# Patient Record
Sex: FEMALE | Race: WHITE | HISPANIC OR LATINO | Employment: UNEMPLOYED | URBAN - METROPOLITAN AREA
[De-identification: names, ages, dates, MRNs, and addresses within clinical notes are randomized per-mention and may not be internally consistent; named-entity substitution may affect disease eponyms.]

---

## 2020-07-20 ENCOUNTER — APPOINTMENT (OUTPATIENT)
Dept: ADMISSIONS | Facility: MEDICAL CENTER | Age: 35
End: 2020-07-20
Attending: OBSTETRICS & GYNECOLOGY
Payer: MEDICAID

## 2020-07-20 DIAGNOSIS — Z01.812 PRE-OPERATIVE LABORATORY EXAMINATION: ICD-10-CM

## 2020-07-20 LAB
ANION GAP SERPL CALC-SCNC: 11 MMOL/L (ref 7–16)
BASOPHILS # BLD AUTO: 0.5 % (ref 0–1.8)
BASOPHILS # BLD: 0.03 K/UL (ref 0–0.12)
BUN SERPL-MCNC: 13 MG/DL (ref 8–22)
CALCIUM SERPL-MCNC: 9.2 MG/DL (ref 8.5–10.5)
CHLORIDE SERPL-SCNC: 106 MMOL/L (ref 96–112)
CO2 SERPL-SCNC: 22 MMOL/L (ref 20–33)
CREAT SERPL-MCNC: 0.68 MG/DL (ref 0.5–1.4)
EOSINOPHIL # BLD AUTO: 0.12 K/UL (ref 0–0.51)
EOSINOPHIL NFR BLD: 1.9 % (ref 0–6.9)
ERYTHROCYTE [DISTWIDTH] IN BLOOD BY AUTOMATED COUNT: 41.1 FL (ref 35.9–50)
GLUCOSE SERPL-MCNC: 110 MG/DL (ref 65–99)
HCG SERPL QL: NEGATIVE
HCT VFR BLD AUTO: 43.3 % (ref 37–47)
HGB BLD-MCNC: 14.6 G/DL (ref 12–16)
IMM GRANULOCYTES # BLD AUTO: 0.02 K/UL (ref 0–0.11)
IMM GRANULOCYTES NFR BLD AUTO: 0.3 % (ref 0–0.9)
LYMPHOCYTES # BLD AUTO: 2.65 K/UL (ref 1–4.8)
LYMPHOCYTES NFR BLD: 42.1 % (ref 22–41)
MCH RBC QN AUTO: 31.5 PG (ref 27–33)
MCHC RBC AUTO-ENTMCNC: 33.7 G/DL (ref 33.6–35)
MCV RBC AUTO: 93.5 FL (ref 81.4–97.8)
MONOCYTES # BLD AUTO: 0.37 K/UL (ref 0–0.85)
MONOCYTES NFR BLD AUTO: 5.9 % (ref 0–13.4)
NEUTROPHILS # BLD AUTO: 3.11 K/UL (ref 2–7.15)
NEUTROPHILS NFR BLD: 49.3 % (ref 44–72)
NRBC # BLD AUTO: 0 K/UL
NRBC BLD-RTO: 0 /100 WBC
PLATELET # BLD AUTO: 215 K/UL (ref 164–446)
PMV BLD AUTO: 10.5 FL (ref 9–12.9)
POTASSIUM SERPL-SCNC: 3.8 MMOL/L (ref 3.6–5.5)
RBC # BLD AUTO: 4.63 M/UL (ref 4.2–5.4)
SODIUM SERPL-SCNC: 139 MMOL/L (ref 135–145)
WBC # BLD AUTO: 6.3 K/UL (ref 4.8–10.8)

## 2020-07-20 PROCEDURE — 84703 CHORIONIC GONADOTROPIN ASSAY: CPT

## 2020-07-20 PROCEDURE — 36415 COLL VENOUS BLD VENIPUNCTURE: CPT

## 2020-07-20 PROCEDURE — 80048 BASIC METABOLIC PNL TOTAL CA: CPT

## 2020-07-20 PROCEDURE — 85025 COMPLETE CBC W/AUTO DIFF WBC: CPT

## 2020-07-20 RX ORDER — ACETAMINOPHEN 325 MG/1
325 TABLET ORAL EVERY 4 HOURS PRN
COMMUNITY
End: 2022-05-20

## 2020-07-21 ENCOUNTER — OFFICE VISIT (OUTPATIENT)
Dept: ADMISSIONS | Facility: MEDICAL CENTER | Age: 35
End: 2020-07-21
Attending: OBSTETRICS & GYNECOLOGY
Payer: MEDICAID

## 2020-07-21 DIAGNOSIS — Z01.812 PRE-OPERATIVE LABORATORY EXAMINATION: ICD-10-CM

## 2020-07-21 LAB — COVID ORDER STATUS COVID19: NORMAL

## 2020-07-21 PROCEDURE — U0003 INFECTIOUS AGENT DETECTION BY NUCLEIC ACID (DNA OR RNA); SEVERE ACUTE RESPIRATORY SYNDROME CORONAVIRUS 2 (SARS-COV-2) (CORONAVIRUS DISEASE [COVID-19]), AMPLIFIED PROBE TECHNIQUE, MAKING USE OF HIGH THROUGHPUT TECHNOLOGIES AS DESCRIBED BY CMS-2020-01-R: HCPCS

## 2020-07-22 LAB
SARS-COV-2 RNA RESP QL NAA+PROBE: NOTDETECTED
SPECIMEN SOURCE: NORMAL

## 2020-07-23 NOTE — OR NURSING
COVID-19 Pre-surgery screenin. Do you have an undiagnosed respiratory illness or symptoms such as coughing or sneezing? No  a. Onset of Sx No  b. Acute vs. chronic respiratory illness No    2. Do you have an unexplained fever greater than 100.4 degrees Fahrenheit or 38 degrees Celsius?     No     3. Have you had direct exposure to a patient who tested positive for Covid-19?    No     4. Have you traveled outside [facility] in the last 14 days? No    5. Have you had any loss of your sense of taste or smell? Have you had N/V or sore throat? No    Patient has been informed of visitor policy and asked to wear a mask upon entering the hospital   Yes

## 2020-07-24 ENCOUNTER — HOSPITAL ENCOUNTER (OUTPATIENT)
Facility: MEDICAL CENTER | Age: 35
End: 2020-07-24
Attending: OBSTETRICS & GYNECOLOGY | Admitting: OBSTETRICS & GYNECOLOGY
Payer: MEDICAID

## 2020-07-24 ENCOUNTER — ANESTHESIA EVENT (OUTPATIENT)
Dept: SURGERY | Facility: MEDICAL CENTER | Age: 35
End: 2020-07-24
Payer: MEDICAID

## 2020-07-24 ENCOUNTER — ANESTHESIA (OUTPATIENT)
Dept: SURGERY | Facility: MEDICAL CENTER | Age: 35
End: 2020-07-24
Payer: MEDICAID

## 2020-07-24 VITALS
HEART RATE: 89 BPM | TEMPERATURE: 97.3 F | OXYGEN SATURATION: 100 % | SYSTOLIC BLOOD PRESSURE: 121 MMHG | RESPIRATION RATE: 16 BRPM | DIASTOLIC BLOOD PRESSURE: 72 MMHG | WEIGHT: 172.18 LBS | HEIGHT: 62 IN | BODY MASS INDEX: 31.68 KG/M2

## 2020-07-24 LAB
HCG UR QL: NEGATIVE
PATHOLOGY CONSULT NOTE: NORMAL

## 2020-07-24 PROCEDURE — 160009 HCHG ANES TIME/MIN: Performed by: OBSTETRICS & GYNECOLOGY

## 2020-07-24 PROCEDURE — 160046 HCHG PACU - 1ST 60 MINS PHASE II: Performed by: OBSTETRICS & GYNECOLOGY

## 2020-07-24 PROCEDURE — 502587 HCHG PACK, D&C: Performed by: OBSTETRICS & GYNECOLOGY

## 2020-07-24 PROCEDURE — 700101 HCHG RX REV CODE 250: Performed by: ANESTHESIOLOGY

## 2020-07-24 PROCEDURE — 700102 HCHG RX REV CODE 250 W/ 637 OVERRIDE(OP): Performed by: OBSTETRICS & GYNECOLOGY

## 2020-07-24 PROCEDURE — 700111 HCHG RX REV CODE 636 W/ 250 OVERRIDE (IP): Performed by: ANESTHESIOLOGY

## 2020-07-24 PROCEDURE — 160036 HCHG PACU - EA ADDL 30 MINS PHASE I: Performed by: OBSTETRICS & GYNECOLOGY

## 2020-07-24 PROCEDURE — C2628 CATHETER, OCCLUSION: HCPCS | Performed by: OBSTETRICS & GYNECOLOGY

## 2020-07-24 PROCEDURE — 501838 HCHG SUTURE GENERAL: Performed by: OBSTETRICS & GYNECOLOGY

## 2020-07-24 PROCEDURE — 160035 HCHG PACU - 1ST 60 MINS PHASE I: Performed by: OBSTETRICS & GYNECOLOGY

## 2020-07-24 PROCEDURE — 160029 HCHG SURGERY MINUTES - 1ST 30 MINS LEVEL 4: Performed by: OBSTETRICS & GYNECOLOGY

## 2020-07-24 PROCEDURE — 700101 HCHG RX REV CODE 250: Performed by: OBSTETRICS & GYNECOLOGY

## 2020-07-24 PROCEDURE — 81025 URINE PREGNANCY TEST: CPT

## 2020-07-24 PROCEDURE — A4314 CATH W/DRAINAGE 2-WAY LATEX: HCPCS | Performed by: OBSTETRICS & GYNECOLOGY

## 2020-07-24 PROCEDURE — 500512 HCHG ENDO PEANUT: Performed by: OBSTETRICS & GYNECOLOGY

## 2020-07-24 PROCEDURE — 502703 HCHG DEVICE, LIGASURE V SEALER: Performed by: OBSTETRICS & GYNECOLOGY

## 2020-07-24 PROCEDURE — 700105 HCHG RX REV CODE 258: Performed by: OBSTETRICS & GYNECOLOGY

## 2020-07-24 PROCEDURE — 160047 HCHG PACU  - EA ADDL 30 MINS PHASE II: Performed by: OBSTETRICS & GYNECOLOGY

## 2020-07-24 PROCEDURE — A9270 NON-COVERED ITEM OR SERVICE: HCPCS | Performed by: ANESTHESIOLOGY

## 2020-07-24 PROCEDURE — 700102 HCHG RX REV CODE 250 W/ 637 OVERRIDE(OP): Performed by: ANESTHESIOLOGY

## 2020-07-24 PROCEDURE — 160041 HCHG SURGERY MINUTES - EA ADDL 1 MIN LEVEL 4: Performed by: OBSTETRICS & GYNECOLOGY

## 2020-07-24 PROCEDURE — 501445 HCHG STAPLER, SKIN DISP: Performed by: OBSTETRICS & GYNECOLOGY

## 2020-07-24 PROCEDURE — 160002 HCHG RECOVERY MINUTES (STAT): Performed by: OBSTETRICS & GYNECOLOGY

## 2020-07-24 PROCEDURE — 160048 HCHG OR STATISTICAL LEVEL 1-5: Performed by: OBSTETRICS & GYNECOLOGY

## 2020-07-24 PROCEDURE — 500002 HCHG ADHESIVE, DERMABOND: Performed by: OBSTETRICS & GYNECOLOGY

## 2020-07-24 PROCEDURE — A9270 NON-COVERED ITEM OR SERVICE: HCPCS | Performed by: OBSTETRICS & GYNECOLOGY

## 2020-07-24 PROCEDURE — 500886 HCHG PACK, LAPAROSCOPY: Performed by: OBSTETRICS & GYNECOLOGY

## 2020-07-24 PROCEDURE — 501568 HCHG TROCAR, BLUNTPORT 12MM: Performed by: OBSTETRICS & GYNECOLOGY

## 2020-07-24 PROCEDURE — 501330 HCHG SET, CYSTO IRRIG TUBING: Performed by: OBSTETRICS & GYNECOLOGY

## 2020-07-24 PROCEDURE — 88307 TISSUE EXAM BY PATHOLOGIST: CPT

## 2020-07-24 PROCEDURE — 160025 RECOVERY II MINUTES (STATS): Performed by: OBSTETRICS & GYNECOLOGY

## 2020-07-24 PROCEDURE — 501581 HCHG TROCAR: Performed by: OBSTETRICS & GYNECOLOGY

## 2020-07-24 PROCEDURE — 700104 HCHG RX REV CODE 254: Performed by: ANESTHESIOLOGY

## 2020-07-24 RX ORDER — BUPIVACAINE HYDROCHLORIDE AND EPINEPHRINE 2.5; 5 MG/ML; UG/ML
INJECTION, SOLUTION EPIDURAL; INFILTRATION; INTRACAUDAL; PERINEURAL
Status: DISCONTINUED | OUTPATIENT
Start: 2020-07-24 | End: 2020-07-24 | Stop reason: HOSPADM

## 2020-07-24 RX ORDER — OXYCODONE HCL 5 MG/5 ML
5 SOLUTION, ORAL ORAL
Status: COMPLETED | OUTPATIENT
Start: 2020-07-24 | End: 2020-07-24

## 2020-07-24 RX ORDER — HYDROMORPHONE HYDROCHLORIDE 1 MG/ML
0.4 INJECTION, SOLUTION INTRAMUSCULAR; INTRAVENOUS; SUBCUTANEOUS
Status: DISCONTINUED | OUTPATIENT
Start: 2020-07-24 | End: 2020-07-24 | Stop reason: HOSPADM

## 2020-07-24 RX ORDER — OXYCODONE HCL 5 MG/5 ML
10 SOLUTION, ORAL ORAL
Status: COMPLETED | OUTPATIENT
Start: 2020-07-24 | End: 2020-07-24

## 2020-07-24 RX ORDER — DEXAMETHASONE SODIUM PHOSPHATE 4 MG/ML
INJECTION, SOLUTION INTRA-ARTICULAR; INTRALESIONAL; INTRAMUSCULAR; INTRAVENOUS; SOFT TISSUE PRN
Status: DISCONTINUED | OUTPATIENT
Start: 2020-07-24 | End: 2020-07-24 | Stop reason: SURG

## 2020-07-24 RX ORDER — SIMETHICONE 80 MG
80 TABLET,CHEWABLE ORAL EVERY 8 HOURS PRN
Status: DISCONTINUED | OUTPATIENT
Start: 2020-07-24 | End: 2020-07-24 | Stop reason: HOSPADM

## 2020-07-24 RX ORDER — HYDROMORPHONE HYDROCHLORIDE 1 MG/ML
0.2 INJECTION, SOLUTION INTRAMUSCULAR; INTRAVENOUS; SUBCUTANEOUS
Status: DISCONTINUED | OUTPATIENT
Start: 2020-07-24 | End: 2020-07-24 | Stop reason: HOSPADM

## 2020-07-24 RX ORDER — KETOROLAC TROMETHAMINE 30 MG/ML
INJECTION, SOLUTION INTRAMUSCULAR; INTRAVENOUS PRN
Status: DISCONTINUED | OUTPATIENT
Start: 2020-07-24 | End: 2020-07-24 | Stop reason: SURG

## 2020-07-24 RX ORDER — CEFAZOLIN SODIUM 1 G/3ML
INJECTION, POWDER, FOR SOLUTION INTRAMUSCULAR; INTRAVENOUS PRN
Status: DISCONTINUED | OUTPATIENT
Start: 2020-07-24 | End: 2020-07-24 | Stop reason: SURG

## 2020-07-24 RX ORDER — ONDANSETRON 2 MG/ML
INJECTION INTRAMUSCULAR; INTRAVENOUS PRN
Status: DISCONTINUED | OUTPATIENT
Start: 2020-07-24 | End: 2020-07-24 | Stop reason: SURG

## 2020-07-24 RX ORDER — PROMETHAZINE HYDROCHLORIDE 25 MG/1
12.5 SUPPOSITORY RECTAL EVERY 4 HOURS PRN
Status: DISCONTINUED | OUTPATIENT
Start: 2020-07-24 | End: 2020-07-24 | Stop reason: HOSPADM

## 2020-07-24 RX ORDER — SODIUM CHLORIDE, SODIUM LACTATE, POTASSIUM CHLORIDE, CALCIUM CHLORIDE 600; 310; 30; 20 MG/100ML; MG/100ML; MG/100ML; MG/100ML
INJECTION, SOLUTION INTRAVENOUS CONTINUOUS
Status: DISCONTINUED | OUTPATIENT
Start: 2020-07-24 | End: 2020-07-24 | Stop reason: HOSPADM

## 2020-07-24 RX ORDER — HYDRALAZINE HYDROCHLORIDE 20 MG/ML
5 INJECTION INTRAMUSCULAR; INTRAVENOUS
Status: DISCONTINUED | OUTPATIENT
Start: 2020-07-24 | End: 2020-07-24 | Stop reason: HOSPADM

## 2020-07-24 RX ORDER — HYDROMORPHONE HYDROCHLORIDE 1 MG/ML
0.1 INJECTION, SOLUTION INTRAMUSCULAR; INTRAVENOUS; SUBCUTANEOUS
Status: DISCONTINUED | OUTPATIENT
Start: 2020-07-24 | End: 2020-07-24 | Stop reason: HOSPADM

## 2020-07-24 RX ORDER — MEPERIDINE HYDROCHLORIDE 25 MG/ML
12.5 INJECTION INTRAMUSCULAR; INTRAVENOUS; SUBCUTANEOUS
Status: DISCONTINUED | OUTPATIENT
Start: 2020-07-24 | End: 2020-07-24 | Stop reason: HOSPADM

## 2020-07-24 RX ORDER — DIPHENHYDRAMINE HYDROCHLORIDE 50 MG/ML
12.5 INJECTION INTRAMUSCULAR; INTRAVENOUS
Status: DISCONTINUED | OUTPATIENT
Start: 2020-07-24 | End: 2020-07-24 | Stop reason: HOSPADM

## 2020-07-24 RX ORDER — LABETALOL HYDROCHLORIDE 5 MG/ML
5 INJECTION, SOLUTION INTRAVENOUS
Status: DISCONTINUED | OUTPATIENT
Start: 2020-07-24 | End: 2020-07-24 | Stop reason: HOSPADM

## 2020-07-24 RX ORDER — LIDOCAINE HYDROCHLORIDE 20 MG/ML
JELLY TOPICAL PRN
Status: DISCONTINUED | OUTPATIENT
Start: 2020-07-24 | End: 2020-07-24 | Stop reason: SURG

## 2020-07-24 RX ORDER — ONDANSETRON 2 MG/ML
4 INJECTION INTRAMUSCULAR; INTRAVENOUS
Status: COMPLETED | OUTPATIENT
Start: 2020-07-24 | End: 2020-07-24

## 2020-07-24 RX ORDER — HYDROMORPHONE HYDROCHLORIDE 2 MG/ML
INJECTION, SOLUTION INTRAMUSCULAR; INTRAVENOUS; SUBCUTANEOUS PRN
Status: DISCONTINUED | OUTPATIENT
Start: 2020-07-24 | End: 2020-07-24 | Stop reason: SURG

## 2020-07-24 RX ORDER — HALOPERIDOL 5 MG/ML
1 INJECTION INTRAMUSCULAR
Status: DISCONTINUED | OUTPATIENT
Start: 2020-07-24 | End: 2020-07-24 | Stop reason: HOSPADM

## 2020-07-24 RX ADMIN — OXYCODONE HYDROCHLORIDE 10 MG: 5 SOLUTION ORAL at 11:26

## 2020-07-24 RX ADMIN — POVIDONE-IODINE 15 ML: 10 SOLUTION TOPICAL at 06:23

## 2020-07-24 RX ADMIN — CEFAZOLIN 2 G: 330 INJECTION, POWDER, FOR SOLUTION INTRAMUSCULAR; INTRAVENOUS at 07:45

## 2020-07-24 RX ADMIN — LIDOCAINE HYDROCHLORIDE 0.5 ML: 10 INJECTION, SOLUTION EPIDURAL; INFILTRATION; INTRACAUDAL at 06:23

## 2020-07-24 RX ADMIN — FENTANYL CITRATE 100 MCG: 50 INJECTION, SOLUTION INTRAMUSCULAR; INTRAVENOUS at 07:42

## 2020-07-24 RX ADMIN — SODIUM CHLORIDE, POTASSIUM CHLORIDE, SODIUM LACTATE AND CALCIUM CHLORIDE: 600; 310; 30; 20 INJECTION, SOLUTION INTRAVENOUS at 06:24

## 2020-07-24 RX ADMIN — DEXAMETHASONE SODIUM PHOSPHATE 8 MG: 4 INJECTION, SOLUTION INTRA-ARTICULAR; INTRALESIONAL; INTRAMUSCULAR; INTRAVENOUS; SOFT TISSUE at 07:48

## 2020-07-24 RX ADMIN — PROPOFOL 150 MG: 10 INJECTION, EMULSION INTRAVENOUS at 07:42

## 2020-07-24 RX ADMIN — FENTANYL CITRATE 50 MCG: 50 INJECTION, SOLUTION INTRAMUSCULAR; INTRAVENOUS at 08:21

## 2020-07-24 RX ADMIN — MIDAZOLAM 2 MG: 1 INJECTION INTRAMUSCULAR; INTRAVENOUS at 07:36

## 2020-07-24 RX ADMIN — ONDANSETRON 4 MG: 2 INJECTION INTRAMUSCULAR; INTRAVENOUS at 10:07

## 2020-07-24 RX ADMIN — FENTANYL CITRATE 50 MCG: 50 INJECTION, SOLUTION INTRAMUSCULAR; INTRAVENOUS at 07:50

## 2020-07-24 RX ADMIN — FENTANYL CITRATE 50 MCG: 50 INJECTION, SOLUTION INTRAMUSCULAR; INTRAVENOUS at 08:38

## 2020-07-24 RX ADMIN — HYDROMORPHONE HYDROCHLORIDE 1 MG: 2 INJECTION, SOLUTION INTRAMUSCULAR; INTRAVENOUS; SUBCUTANEOUS at 10:03

## 2020-07-24 RX ADMIN — ONDANSETRON 4 MG: 2 INJECTION INTRAMUSCULAR; INTRAVENOUS at 11:17

## 2020-07-24 RX ADMIN — LIDOCAINE HYDROCHLORIDE 4 ML: 20 JELLY TOPICAL at 07:42

## 2020-07-24 RX ADMIN — KETOROLAC TROMETHAMINE 30 MG: 30 INJECTION, SOLUTION INTRAMUSCULAR at 10:07

## 2020-07-24 RX ADMIN — ROCURONIUM BROMIDE 10 MG: 10 INJECTION, SOLUTION INTRAVENOUS at 08:23

## 2020-07-24 RX ADMIN — FENTANYL CITRATE 50 MCG: 50 INJECTION INTRAMUSCULAR; INTRAVENOUS at 14:36

## 2020-07-24 RX ADMIN — DIPHENHYDRAMINE HYDROCHLORIDE 12.5 MG: 50 INJECTION INTRAMUSCULAR; INTRAVENOUS at 13:13

## 2020-07-24 RX ADMIN — INDIGO CARMINE 5 ML: 8 INJECTION, SOLUTION INTRAMUSCULAR; INTRAVENOUS at 09:40

## 2020-07-24 RX ADMIN — HYDROMORPHONE HYDROCHLORIDE 1 MG: 2 INJECTION, SOLUTION INTRAMUSCULAR; INTRAVENOUS; SUBCUTANEOUS at 09:17

## 2020-07-24 RX ADMIN — ROCURONIUM BROMIDE 40 MG: 10 INJECTION, SOLUTION INTRAVENOUS at 07:42

## 2020-07-24 RX ADMIN — SODIUM CHLORIDE, POTASSIUM CHLORIDE, SODIUM LACTATE AND CALCIUM CHLORIDE: 600; 310; 30; 20 INJECTION, SOLUTION INTRAVENOUS at 08:57

## 2020-07-24 ASSESSMENT — PAIN SCALES - GENERAL: PAIN_LEVEL: 6

## 2020-07-24 NOTE — DISCHARGE INSTRUCTIONS
ACTIVITY: Rest and take it easy for the first 24 hours.  A responsible adult is recommended to remain with you during that time.  It is normal to feel sleepy.  We encourage you to not do anything that requires balance, judgment or coordination.    MILD FLU-LIKE SYMPTOMS ARE NORMAL. YOU MAY EXPERIENCE GENERALIZED MUSCLE ACHES, THROAT IRRITATION, HEADACHE AND/OR SOME NAUSEA.    FOR 24 HOURS DO NOT:  Drive, operate machinery or run household appliances.  Drink beer or alcoholic beverages.   Make important decisions or sign legal documents.    SPECIAL INSTRUCTIONS: Call for a temp >100.4, heavy vaginal bleeding, foul smelling discharge, or if her incision oozes blood, pus, or fluid.  No driving x 2 weeks or while on narcotics, no lifting >20 pounds x 4 weeks, and pelvic rest x 6 weeks.  Ambulate TID.  Call for signs/symptoms of DVT/PE.  NOTICE: This order will  in 24 hours.      DIET: To avoid nausea, slowly advance diet as tolerated, avoiding spicy or greasy foods for the first day.  Add more substantial food to your diet according to your physician's instructions.  Babies can be fed formula or breast milk as soon as they are hungry.  INCREASE FLUIDS AND FIBER TO AVOID CONSTIPATION.    SURGICAL DRESSING/BATHING: see above    FOLLOW-UP APPOINTMENT:  A follow-up appointment should be arranged with your doctor in 1-2 weeks; call to schedule.    You should CALL YOUR PHYSICIAN if you develop:  Fever greater than 101 degrees F.  Pain not relieved by medication, or persistent nausea or vomiting.  Excessive bleeding (blood soaking through dressing) or unexpected drainage from the wound.  Extreme redness or swelling around the incision site, drainage of pus or foul smelling drainage.  Inability to urinate or empty your bladder within 8 hours.  Problems with breathing or chest pain.    You should call 911 if you develop problems with breathing or chest pain.  If you are unable to contact your doctor or surgical center,  you should go to the nearest emergency room or urgent care center.  Physician's telephone #: [phone]    If any questions arise, call your doctor.  If your doctor is not available, please feel free to call the Surgical Center at [phone].  The Center is open Monday through Friday from 7AM to 7PM.  You can also call the HEALTH HOTLINE open 24 hours/day, 7 days/week and speak to a nurse at [phone], or toll free at [phone].    A registered nurse may call you a few days after your surgery to see how you are doing after your procedure.    MEDICATIONS: Resume taking daily medication.  Take prescribed pain medication with food.  If no medication is prescribed, you may take non-aspirin pain medication if needed.  PAIN MEDICATION CAN BE VERY CONSTIPATING.  Take a stool softener or laxative such as senokot, pericolace, or milk of magnesia if needed.    Prescription given for percocet.  Last pain medication given at 11:26 AM, next dose may be taken around 3:26 PM.    If your physician has prescribed pain medication that includes Acetaminophen (Tylenol), do not take additional Acetaminophen (Tylenol) while taking the prescribed medication.    Depression / Suicide Risk    As you are discharged from this [facility] facility, it is important to learn how to keep safe from harming yourself.    Recognize the warning signs:  · Abrupt changes in personality, positive or negative- including increase in energy   · Giving away possessions  · Change in eating patterns- significant weight changes-  positive or negative  · Change in sleeping patterns- unable to sleep or sleeping all the time   · Unwillingness or inability to communicate  · Depression  · Unusual sadness, discouragement and loneliness  · Talk of wanting to die  · Neglect of personal appearance   · Rebelliousness- reckless behavior  · Withdrawal from people/activities they love  · Confusion- inability to concentrate     If you or a loved one observes  any of these behaviors or has concerns about self-harm, here's what you can do:  · Talk about it- your feelings and reasons for harming yourself  · Remove any means that you might use to hurt yourself (examples: pills, rope, extension cords, firearm)  · Get professional help from the community (Mental Health, Substance Abuse, psychological counseling)  · Do not be alone:Call your Safe Contact- someone whom you trust who will be there for you.  · Call your local CRISIS HOTLINE 331-7434 or 787-520-9916  · Call your local Children's Mobile Crisis Response Team [place] (605) 205-2225 or www.[employer]  · Call the toll free National Suicide Prevention Hotlines   · National Suicide Prevention Lifeline 556-949-HVQY (2527)  · National Hope Line Network 800-SUICIDE (956-0096)

## 2020-07-24 NOTE — OP REPORT
DATE OF SERVICE:  07/24/2020    PREOPERATIVE DIAGNOSES:  1.  Abnormal uterine bleeding.  2.  Dysmenorrhea.  3.  Menorrhagia.  4.  Fibroid uterus.  5.  Benign endometrial biopsy.  6.  Nexplanon removal.  7.  Desires definitive surgical management.    POSTOPERATIVE DIAGNOSES:  As above.    PROCEDURES:  Exam under anesthesia, pelviscopy, laparoscopic-assisted vaginal   hysterectomy, bilateral salpingectomy, placement of Samina, cystoscopy and   Nexplanon removal.    SURGEON:  Heidi Norris MD    ASSISTANT:  Brian Laws MD    ANESTHESIOLOGIST:  Johnathan Roldan MD    ANESTHESIA:  General endotracheal tube anesthesia and local anesthetic.    SPECIMENS:  Uterus, cervix and bilateral fallopian tubes.    ESTIMATED BLOOD LOSS:  100 mL.    FINDINGS:  On examination under anesthesia, her uterus is anteverted, mobile   and 10-week size.  There were no cervical masses, no adnexal masses palpated.    The AnaptysBioare manipulator was used.    Nexplanon was palpated in the proper subdermal location in her left upper arm.    Nexplanon was removed without difficulty, but not sent to pathology.    LAPAROSCOPIC FINDINGS:  Normal bilateral tubes and bilateral ovaries.    Bilateral ureters are visualized.  Normal upper abdomen.  Enlarged bulky   uterus without obvious subserosal fibroids or masses.  Filmy adhesion from the   left bowel epiploica to the left pelvic sidewall.    CYSTOSCOPIC FINDINGS:  Bilateral efflux of blue urine from both ureteral   orifices.  No obvious bladder masses.    COMPLICATIONS:  None apparent.    INDICATIONS FOR THE PROCEDURE:  The patient is a 34-year-old para 3-0-0-3   sexually active woman with the Nexplanon in place for contraception and cycle   control, who has been having worsening pelvic pain, dyspareunia, dysmenorrhea,   abnormal uterine bleeding and menorrhagia.  The patient has fibroid uterus.    She desires definitive surgical management.  The patient desires Nexplanon   removal.    DETAILS OF  THE PROCEDURE:  The patient was taken to the operating room where   she underwent general endotracheal tube anesthesia.  The patient was placed in   the dorsal lithotomy position.  An examination under anesthesia was performed   and the findings noted as above.  The patient was then prepped and draped in   the dorsal lithotomy position.  A Wright catheter was placed in her urinary   bladder.  Medium Graves speculum was inserted into the vagina.  The cervix was   visualized.  The anterior lip of the cervix was grasped with an Allis clamp.    The endocervix was dilated to allow the VCare manipulator to be placed   without difficulty.  The VCare manipulator was placed.    The attention was turned to the laparoscopic portion of the procedure.  A 10   mm infraumbilical skin incision was made with the scalpel after the   instillation of 0.25% Marcaine with epinephrine.  The incision was carried   down to the level of the fascia with hemostats.  The fascia was grasped with   Kocher clamps, elevated and incised with Heard scissors.  The incision was   extended laterally with Heard scissors.  Either side of the fascial incision   was sutured with 0 Vicryl as stay sutures.  The S retractors were placed   through all these incisions.  The peritoneum was identified, grasped with   hemostats, and entered sharply with Metzenbaum scissors.  The incision was   extended with Metzenbaum scissors.  Intraabdominal entry was confirmed.  The   Stefano trocar was inserted under direct visualization.  The CO2 gas was   connected and the opening pressure was 8 mmHg.    The patient was placed in Trendelenburg.  The pelvis and abdomen were explored   and findings noted as above.  The bilateral lateral trocar sites were   identified by transilluminating the anterior abdominal wall.  5 mm skin   incisions were made with the scalpel bilaterally after the instillation of   0.25% Marcaine with epinephrine.  The 5 mm trocars were inserted without    difficulty.    The uterus was elevated using the VCare.  The bilateral ureters were   identified coursing through the medial leaves of the broad ligament.  The left   fallopian tube was grasped with the Prestige clamp and brought to the   midline.  Using the LigaSure, a salpingectomy was performed from the   fimbriated end to 2 cm from the cornual region of the uterus.  The   uteroovarian ligament was cauterized in 3 proximal locations and then incised.    The broad ligament was cauterized and then incised and the round ligament   was cauterized in 3 proximal locations and then incised.  The anterior leaf of   the broad ligament was undermined with the one-half blade of the LigaSure.    The vesicouterine peritoneum was grasped with the Prestige clamp, elevated and   the bladder flap was created.  The bladder was pushed off the lower uterine   segment of the uterus.  The left uterine artery was cauterized in 3 proximal   locations and then incised.  Attention was turned to the right side of the   pelvis.  The right ureter was identified coursing through the medial leaf of   the broad ligament.  The right fallopian tube was grasped with the Prestige   clamp and brought to the midline.  Salpingectomy from the fimbriated end of   the fallopian tube to the 2 cm from the cornual region of the uterus was   performed.  The uteroovarian ligament was cauterized in 3 proximal locations   and then incised.  The broad ligament was cauterized and then incised.  The   round ligament was cauterized in 3 proximal locations and then incised.  The   anterior leaf of the broad ligament was elevated, undermined with one-half   blade of the LigaSure, cauterized, and then incised.  The bladder flap was   created in the usual fashion following along the vesicouterine peritoneum.    The uterine arteries were skeletonized with the LigaSure and the uterine   artery was cauterized in 3 proximal locations and then incised.  The uterine    arteries on the right side were cauterized in 3 proximal locations and then   incised.  The pedicles were found to be hemostatic.  The CO2 gas was   discontinued.  The laparoscope was removed.  The light was placed on standby   and attention was turned to the vaginal hysterectomy portion of the procedure.    The patient was placed in high lithotomy position.  The Dinah manipulator was   removed from the patient's uterus.  The long weighted speculum and the right   angle retractor were placed within the vagina.  The cervix was grasped with   double tooth tenacula and the circumferential injection of 0.25% Marcaine with   epinephrine was placed in the cervicovaginal junction.  A circumferential   incision was made using the electrocautery.  The vaginal mucosa was pushed off   of the cervix.  The posterior peritoneum was grasped with the pickups and   entered sharply with Metzenbaum scissors and entry into the posterior   cul-de-sac was performed.  The long weighted speculum was inserted into the   posterior cul-de-sac.  The anterior cul-de-sac was entered in the usual   fashion.  Using the Metzenbaum scissors, sharp dissection was performed and   the bladder was pushed off of the vagina.  The supravaginal septum was then   entered and the Dinorah retractor was placed within the anterior cul-de-sac.    The bilateral uterosacral ligaments were palpated using curved Esme clamps   bilaterally.  These were clamped, incised with Heard scissors, and suture   ligated with 0 Vicryl.  These were held to be incorporated in the closure at   the end of the procedure.  The remaining attachments along the cardinal   ligament and broad ligament were clamped bilaterally with Esme clamps,   incised and suture ligated with 0 Vicryl.  The specimen was delivered through   the vagina.  The pedicles were examined and found to be hemostatic.  The   posterior vaginal cuff along with the posterior peritoneum and the anterior   vaginal cuff  along with the anterior peritoneum was grasped with Allis clamps.    The bilateral uterosacral ligaments were incorporated into the vaginal   angles.  The remainder of the vaginal cuff was reapproximated with 0 Vicryl in   a running locked fashion.  The vaginal cuff was irrigated with sterile water   and areas of bleeding in the midline was noted.  This was made hemostatic with   a figure-of-X of 0 Vicryl suture.  The vaginal cuff was found to be   hemostatic.  Attention was again turned to the laparoscopic portion of the   procedure.  The CO2 gas was reconnected.  The laparoscope was inserted.  The   vaginal cuff was examined and all pedicles were examined.  Hemostasis was   assured.  Samina was placed along all of the pedicles.  The patient received   indigo carmine dye at this point in the procedure.  The catheter was removed.    The 30-degree cystoscope was inserted under direct visualization.  The   bladder was inspected and bilateral efflux of blue urine was noted from both   ureteral orifices.  The cystoscope was removed.  The catheter was returned to   the patient's urinary bladder.  The trocars were removed under direct   visualization.  The CO2 gas was released from the patient's abdomen.  The   fascia was reapproximated with 0 Vicryl using the stay sutures.  The   subcutaneous tissues were reapproximated using 0 Vicryl in a figure-of-X   fashion and the skin was closed with 4-0 Vicryl in a subcuticular fashion.    The wounds were dressed with benzoin, Steri-Strips, 2x2 and Tegaderm.    The patient's left arm was untucked and placed on the arm board.  It was   cleansed with Betadine x3, 10 mL, it was palpated and brought to the skin   surface.  The skin was injected with 0.25% Marcaine with epinephrine.  A 2 mm   skin incision was made with the #11 blade scalpel.  The Nexplanon was brought   to the skin surface, grasped with a hemostat and delivered through the   incision.  The incision was reapproximated  with 3-0 Vicryl in a figure-of-X   fashion.  Steri-Strips were applied to the incision and a pressure wrap using   an Ace bandage was also applied.    The patient was taken out of dorsal lithotomy position.  She was then   extubated and taken to the PACU in stable condition.  Sponge, lap and needle   counts were correct x2.       ____________________________________     MELONY BEAR MD    HTA / NTS    DD:  07/24/2020 11:06:04  DT:  07/24/2020 12:37:16    D#:  [id number]  Job#:  [id number]

## 2020-07-24 NOTE — H&P
DATE OF PROCEDURE:  07/24/2020    PREOPERATIVE DIAGNOSES:  1.  Abnormal uterine bleeding.  2.  Dysmenorrhea.  3.  Menorrhagia.  4.  Benign endometrial biopsy.  5.  Fibroid uterus.  6.  Desires definitive surgical management.  7.  She has a Nexplanon in place and desires removal.    PLANNED PROCEDURES:  Examination under anesthesia, pelviscopy,   laparoscopic-assisted vaginal hysterectomy, bilateral salpingectomy,   cystoscopy and Nexplanon removal.    HISTORY OF PRESENT ILLNESS:  The patient is a 34-year-old para 3-0-0-3   sexually active woman with a Nexplanon in place for contraception and cycle   control, who has been having worsening pelvic pain, dyspareunia, dysmenorrhea,   abnormal uterine bleeding and menorrhagia.  The patient underwent a pelvic   ultrasound in 02/2020, which demonstrated that her uterus was anteverted and   measures 8.8x4.3x6 cm.  There was a right fundal intramural fibroid measuring   1.3x1.2x1.2 cm.  The posterior uterine corpus intramural fibroid measures   1.3x0.8x1.1 cm.  A left fundal intramural fibroid measures1.5x1.5x1.3 cm.    There is a posterior lower uterine segment intramural to subserosal fibroid   measuring 1.3x1.1x0.8 cm.  Her endometrium measures 6.5 mm.  Right ovary   measures 3.5 x3.6x2.1 cm with normal follicles without cyst or masses.  Left   ovary measures 3.4x2.5x3.1 cm.  The patient underwent an endometrial biopsy on   07/02/2020, which demonstrated predominantly mucin with rare strips of   endocervical epithelium, no definite endometrial tissue identified.  Negative   for dysplasia or malignancy.  On 07/09/2020, she underwent a repeat   endometrial biopsy, which demonstrated predominantly endocervical epithelium   in a background of mucin with benign endometrial tissue, negative for   dysplasia.    The patient underwent a Pap smear on 05/29/2020, which was negative for   intraepithelial lesion or malignancy and negative for high-risk HPV.    GC, chlamydia were  negative on 05/29/2020.  The risks, benefits and   alternatives of an examination under anesthesia, pelviscopy,   laparoscopic-assisted vaginal hysterectomy, bilateral salpingectomy,   cystoscopy and removal of Nexplanon were discussed with the patient and she   agrees to proceed.    The patient underwent a repeat ultrasound on 06/24/2020, which demonstrated   that her uterus measured 7.6x4.9x4.6 cm.  The endometrial stripe measured 0.7   cm in thickness.  Normal appearance of the cervix.  Hypoechoic mass along the   right body of the uterus measuring 1.1x1.1x1.2 cm, likely representing an   intramural fibroid.  There is a hypoechoic mass along the posterior body of   the uterus measuring 1.3x1 and likely represents an intramural fibroid.  No   submucosal fibroids noted.  Bilateral ovaries demonstrate normal flow.  Right   ovary measures 2.7x2.5x2 cm.  Left ovary measures 2.3x2x1.9 cm.  Right ovary   with a dominant follicle measuring 1.3 cm.  No solid adnexal masses.  Dominant   follicle in the left ovary measuring 1.3 cm.  No free fluid in the   cul-de-sac.  The impression was intramural uterine fibroids and follicles in   the bilateral ovaries without adnexal masses.    PAST SURGICAL HISTORY:  None.    PAST MEDICAL HISTORY:  Migraines.  She also has rotator cuff injury.      PAST OBSTETRIC HISTORY:  NSVDs x3.    ALLERGIES:  No known drug allergies.    SOCIAL HISTORY:  She is .  She denies alcohol, tobacco or drugs of   abuse.      REVIEW OF SYSTEMS:  Negative.    FAMILY HISTORY:  Noncontributory.    GYNECOLOGIC HISTORY:  She denies STDs, PID or abnormal Paps.  She uses   Nexplanon for contraception and desires removal on the day of surgery.    PREOPERATIVE PHYSICAL EXAMINATION:  VITAL SIGNS:  Her blood pressure is 112/76, pulse 83, temp 97.2.  GENERAL:  Alert, awake and oriented x3, in no acute distress.  LUNGS:  Clear to auscultation bilaterally.  HEART:  Regular rate and rhythm.  No murmurs, rubs or  gallops.  S1, S2 intact.  EXTREMITIES:  No clubbing, cyanosis or edema.  GENITOURINARY:  Examination is deferred.    PREOPERATIVE LABORATORY STUDIES:  COVID testing is negative on 07/21/2020.    White count 6.3, hemoglobin 16.4, hematocrit 43.3, platelets 215.  Qualitative   hCG is negative.  Sodium 139, potassium 3.8, chloride 106, CO2 of 22, glucose   110, BUN 13, creatinine 0.68, calcium 9.2.    ASSESSMENT AND PLAN:  A 34-year-old para 3-0-0-3 sexually active woman with a   Nexplanon in place for contraception and cycle control, who has had a long   history of pelvic pain, dyspareunia, dysmenorrhea, menorrhagia and abnormal   uterine bleeding, who has multiple uterine fibroids and who desires definitive   surgical management.    We will proceed to the operating room for an examination under anesthesia,   pelviscopy, laparoscopic-assisted vaginal hysterectomy, bilateral   salpingectomy, cystoscopy and removal of Nexplanon.  Risks, benefits and   alternatives were discussed with the patient and she agrees to proceed.       ____________________________________     MELONY BEAR MD    HTA / NTS    DD:  07/23/2020 18:05:07  DT:  07/23/2020 20:50:23    D#:  [id number]  Job#:  [id number]

## 2020-07-24 NOTE — ANESTHESIA POSTPROCEDURE EVALUATION
Patient: Moon Barragan    Procedure Summary     Date:  07/24/20 Room / Location:  [place] [address] / SURGERY [facility]    Anesthesia Start:  0736 Anesthesia Stop:  1023    Procedures:       EXAM UNDER ANESTHESIA, PELVIS (N/A Vagina )      PELVISCOPY (N/A Abdomen)      HYSTERECTOMY, TOTAL, VAGINAL, LAPAROSCOPY-ASSISTED (N/A Uterus)      SALPINGECTOMY (Bilateral Fallopian Tube)      CYSTOSCOPY- AND OTHER MEDICALLY NECESSARY PROCEDURES (N/A Urethra)      INCISION AND DRAINAGE - NEXPLANON REMOVAL (N/A Abdomen) Diagnosis:  (INTRAMURAL LEIOMYOMA OF UTERUS, ABNORMAL UTERINE BLEEDING, PELVIC PAIN)    Surgeon:  Heidi Norris M.D. Responsible Provider:  Johnathan Roldan M.D.    Anesthesia Type:  general ASA Status:  2          Final Anesthesia Type: general  Last vitals  BP   Blood Pressure: 121/72    Temp   36.3 °C (97.3 °F)    Pulse   Pulse: 89   Resp   16    SpO2   100 %      Anesthesia Post Evaluation    Patient location during evaluation: PACU  Patient participation: complete - patient participated  Level of consciousness: awake and alert  Pain score: 6    Airway patency: patent  Anesthetic complications: no  Cardiovascular status: hemodynamically stable  Respiratory status: acceptable  Hydration status: euvolemic    PONV: none           Nurse Pain Score: 8 (NPRS)

## 2020-07-24 NOTE — ANESTHESIA TIME REPORT
Anesthesia Start and Stop Event Times     Date Time Event    7/24/2020 0708 Ready for Procedure     0736 Anesthesia Start     1023 Anesthesia Stop        Responsible Staff  07/24/20    Name Role Begin End    Johnathan Roldan M.D. Anesth 0736 1023        Preop Diagnosis (Free Text):  Pre-op Diagnosis     INTRAMURAL LEIOMYOMA OF UTERUS, ABNORMAL UTERINE BLEEDING        Preop Diagnosis (Codes):    Post op Diagnosis  Leiomyoma of uterus  Abnormal uterine bleeding    Premium Reason  Non-Premium    Comments:

## 2020-07-24 NOTE — OR NURSING
Report to [facility] HERNÁNDEZ  Surgery by Dr Magana. 3 incisions to abdomen . 2x2 tegaderm. peripad  And briefs. Left UA steristrips 2x2 tegader and ace wrap.    rec'd toradol and dilaudid 2 mg IV in surgery.  Oxycodone 10 mg PO in PACU. States abdomen burning and cramping.Pt reports pain tolerable. Reported nausea. zofran given,    VSS HR 90 /64 94% room air. RR 14    Order to take goodwin out PACU nurse will dc prior transfer to phase 2.

## 2020-07-24 NOTE — OR NURSING
Dr Magana at bedside. Pt sedated.  Asked Dr Magana if she wanted  Backfill of bladder prior goodwin removal. Dr Magana said no.

## 2020-07-24 NOTE — OR SURGEON
Immediate Post OP Note    PreOp Diagnosis:     1.  Dysmenorrhea/pelvic pain.  2.  Menorrhagia/AUB.  3.  Fibroid uterus.  4.  Desires Nexplanon removal.  5.  Desires definitive surgical management.    PostOp Diagnosis: As above.    Procedure(s):  EXAM UNDER ANESTHESIA, PELVIS - Wound Class: Clean Contaminated  PELVISCOPY - Wound Class: Clean  HYSTERECTOMY, TOTAL, VAGINAL, LAPAROSCOPY-ASSISTED - Wound Class: Clean Contaminated  SALPINGECTOMY - Wound Class: Clean Contaminated  CYSTOSCOPY- AND OTHER MEDICALLY NECESSARY PROCEDURES - Wound Class: Clean Contaminated  PLACEMENT OF DESIRE   NEXPLANON REMOVAL - Wound Class: Clean    Surgeon(s):  OLIVIER Monroy M.D.    Anesthesiologist/Type of Anesthesia:  Anesthesiologist: Johnathan Roldan M.D./General and local anesthetic    Surgical Staff:  Circulator: Filemon Calderon R.N.  Scrub Person: Janina To    Specimens removed if any:  ID Type Source Tests Collected by Time Destination   A : UTERUS, CERVIX, BILATERAL FALLOPIAN TUBES Tissue Uterus PATHOLOGY SPECIMEN Heidi Norris M.D. 7/24/2020 0910        Estimated Blood Loss: 100 cc    Findings: EUA: uterus is AV mobile and 10 weeks size.  No cervical masses.  No adnexal masses.  V Care manipulator used.      Nexplanon palpated in the proper subdermal location in her left upper arm.  Removed without difficulty - not sent to pathology.    Laparoscopic findings: normal bilateral tubes and bilateral ovaries.  Bilateral ureters visualized.  Normal upper abdomen.  Enlarged/bulky uterus without obvious masses/fibroids.     Filmy adhesion from the left bowel epiploica to the pelvic side wall.       Cystoscopic findings: bilateral efflux of blue urine from both ureteral orifices.  No obvious bladder masses.      Complications: None apparent.        7/24/2020 10:22 AM Heidi Norris M.D.

## 2020-07-24 NOTE — OR NURSING
@1204 Pt arrived to Phase 2. Pt has no complaints of pain. Pt has complaints of nausea, queese ease and emesis bag provided. Incision sites noted to be clean dry and intact. Icepack placed on incision sites for comfort.    @1214 Discharge instructions discussed with patient at bedside. Pt verbalizes understanding.    @1316 Pt having nausea and dizziness, Pt medicated per mar for nausea, Pt started on IV fluids per mar. Pt requested spouse to be with patient. Pts Spouse Oliver notified of patients status. Pts spouse to come back to discharge room. Pts O2 saturation down to 88 on room air, Pt placed on 1L O2 at 94% saturation.    @1415 Pt ambulated to bathroom able to void without issue.    @1443 Pt having 8/10 abdominal pain. Pt medicated per mar.    @1531 Pt states her pain is down to 4/10. Pt vomited x1, states she no longer feels nauseas at this time. Pt ambulated to restroom, able to void without issue. PT states she feels ready for discharge, PIV removed. Pt able to dress self with the assistance of PTs .    @1549 Pt discharged via wheelchair to home with pts  and all belongings.

## 2020-07-24 NOTE — ANESTHESIA PROCEDURE NOTES
Airway    Date/Time: 7/24/2020 7:43 AM  Performed by: Johnathan Roldan M.D.  Authorized by: Johnathan Roldan M.D.     Location:  OR  Urgency:  Elective  Difficult Airway: No    Indications for Airway Management:  Anesthesia      Spontaneous Ventilation: absent    Sedation Level:  Deep  Preoxygenated: Yes    Patient Position:  Sniffing  Mask Difficulty Assessment:  0 - not attempted  Final Airway Type:  Endotracheal airway  Final Endotracheal Airway:  ETT  Cuffed: Yes    Technique Used for Successful ETT Placement:  Direct laryngoscopy    Insertion Site:  Oral  Blade Type:  Moan  Laryngoscope Blade/Videolaryngoscope Blade Size:  3  ETT Size (mm):  7.0  Measured from:  Teeth  ETT to Teeth (cm):  23  Placement Verified by: auscultation and capnometry    Cormack-Lehane Classification:  Grade I - full view of glottis  Number of Attempts at Approach:  1

## 2020-07-25 ENCOUNTER — APPOINTMENT (OUTPATIENT)
Dept: RADIOLOGY | Facility: MEDICAL CENTER | Age: 35
End: 2020-07-25
Attending: EMERGENCY MEDICINE
Payer: MEDICAID

## 2020-07-25 ENCOUNTER — HOSPITAL ENCOUNTER (EMERGENCY)
Facility: MEDICAL CENTER | Age: 35
End: 2020-07-25
Attending: EMERGENCY MEDICINE
Payer: MEDICAID

## 2020-07-25 VITALS
BODY MASS INDEX: 31.68 KG/M2 | OXYGEN SATURATION: 97 % | DIASTOLIC BLOOD PRESSURE: 74 MMHG | HEART RATE: 101 BPM | HEIGHT: 62 IN | WEIGHT: 172.18 LBS | RESPIRATION RATE: 22 BRPM | TEMPERATURE: 97.5 F | SYSTOLIC BLOOD PRESSURE: 120 MMHG

## 2020-07-25 DIAGNOSIS — K66.8 SURGICAL PNEUMOPERITONEUM: ICD-10-CM

## 2020-07-25 DIAGNOSIS — R10.11 RIGHT UPPER QUADRANT ABDOMINAL PAIN: ICD-10-CM

## 2020-07-25 LAB
ALBUMIN SERPL BCP-MCNC: 4.5 G/DL (ref 3.2–4.9)
ALBUMIN/GLOB SERPL: 1.8 G/DL
ALP SERPL-CCNC: 73 U/L (ref 30–99)
ALT SERPL-CCNC: 33 U/L (ref 2–50)
ANION GAP SERPL CALC-SCNC: 13 MMOL/L (ref 7–16)
AST SERPL-CCNC: 17 U/L (ref 12–45)
BASOPHILS # BLD AUTO: 0.1 % (ref 0–1.8)
BASOPHILS # BLD: 0.02 K/UL (ref 0–0.12)
BILIRUB SERPL-MCNC: 1.1 MG/DL (ref 0.1–1.5)
BUN SERPL-MCNC: 7 MG/DL (ref 8–22)
CALCIUM SERPL-MCNC: 9 MG/DL (ref 8.5–10.5)
CHLORIDE SERPL-SCNC: 104 MMOL/L (ref 96–112)
CO2 SERPL-SCNC: 22 MMOL/L (ref 20–33)
CREAT SERPL-MCNC: 0.58 MG/DL (ref 0.5–1.4)
EOSINOPHIL # BLD AUTO: 0.03 K/UL (ref 0–0.51)
EOSINOPHIL NFR BLD: 0.2 % (ref 0–6.9)
ERYTHROCYTE [DISTWIDTH] IN BLOOD BY AUTOMATED COUNT: 41.4 FL (ref 35.9–50)
GLOBULIN SER CALC-MCNC: 2.5 G/DL (ref 1.9–3.5)
GLUCOSE SERPL-MCNC: 101 MG/DL (ref 65–99)
HCT VFR BLD AUTO: 41.7 % (ref 37–47)
HGB BLD-MCNC: 14 G/DL (ref 12–16)
IMM GRANULOCYTES # BLD AUTO: 0.05 K/UL (ref 0–0.11)
IMM GRANULOCYTES NFR BLD AUTO: 0.4 % (ref 0–0.9)
LYMPHOCYTES # BLD AUTO: 3.39 K/UL (ref 1–4.8)
LYMPHOCYTES NFR BLD: 24.9 % (ref 22–41)
MCH RBC QN AUTO: 31.7 PG (ref 27–33)
MCHC RBC AUTO-ENTMCNC: 33.6 G/DL (ref 33.6–35)
MCV RBC AUTO: 94.3 FL (ref 81.4–97.8)
MONOCYTES # BLD AUTO: 0.7 K/UL (ref 0–0.85)
MONOCYTES NFR BLD AUTO: 5.1 % (ref 0–13.4)
NEUTROPHILS # BLD AUTO: 9.44 K/UL (ref 2–7.15)
NEUTROPHILS NFR BLD: 69.3 % (ref 44–72)
NRBC # BLD AUTO: 0 K/UL
NRBC BLD-RTO: 0 /100 WBC
PLATELET # BLD AUTO: 205 K/UL (ref 164–446)
PMV BLD AUTO: 10.6 FL (ref 9–12.9)
POTASSIUM SERPL-SCNC: 3.6 MMOL/L (ref 3.6–5.5)
PROT SERPL-MCNC: 7 G/DL (ref 6–8.2)
RBC # BLD AUTO: 4.42 M/UL (ref 4.2–5.4)
SODIUM SERPL-SCNC: 139 MMOL/L (ref 135–145)
TROPONIN T SERPL-MCNC: <6 NG/L (ref 6–19)
WBC # BLD AUTO: 13.6 K/UL (ref 4.8–10.8)

## 2020-07-25 PROCEDURE — 84484 ASSAY OF TROPONIN QUANT: CPT

## 2020-07-25 PROCEDURE — 85025 COMPLETE CBC W/AUTO DIFF WBC: CPT

## 2020-07-25 PROCEDURE — 80053 COMPREHEN METABOLIC PANEL: CPT

## 2020-07-25 PROCEDURE — 96374 THER/PROPH/DIAG INJ IV PUSH: CPT | Mod: XU

## 2020-07-25 PROCEDURE — 96376 TX/PRO/DX INJ SAME DRUG ADON: CPT | Mod: XU

## 2020-07-25 PROCEDURE — 96375 TX/PRO/DX INJ NEW DRUG ADDON: CPT | Mod: XU

## 2020-07-25 PROCEDURE — 71275 CT ANGIOGRAPHY CHEST: CPT

## 2020-07-25 PROCEDURE — 700117 HCHG RX CONTRAST REV CODE 255: Performed by: EMERGENCY MEDICINE

## 2020-07-25 PROCEDURE — 99284 EMERGENCY DEPT VISIT MOD MDM: CPT

## 2020-07-25 PROCEDURE — 700111 HCHG RX REV CODE 636 W/ 250 OVERRIDE (IP): Performed by: EMERGENCY MEDICINE

## 2020-07-25 RX ORDER — HYDROMORPHONE HYDROCHLORIDE 1 MG/ML
0.5 INJECTION, SOLUTION INTRAMUSCULAR; INTRAVENOUS; SUBCUTANEOUS
Status: DISCONTINUED | OUTPATIENT
Start: 2020-07-25 | End: 2020-07-25 | Stop reason: HOSPADM

## 2020-07-25 RX ORDER — IBUPROFEN 400 MG/1
400 TABLET ORAL EVERY 6 HOURS PRN
Qty: 30 TAB | Refills: 0 | Status: SHIPPED | OUTPATIENT
Start: 2020-07-25 | End: 2022-05-20

## 2020-07-25 RX ORDER — DIAZEPAM 2 MG/1
2 TABLET ORAL EVERY 6 HOURS PRN
Qty: 12 TAB | Refills: 0 | Status: SHIPPED | OUTPATIENT
Start: 2020-07-25 | End: 2020-07-28

## 2020-07-25 RX ORDER — HYDROMORPHONE HYDROCHLORIDE 1 MG/ML
1 INJECTION, SOLUTION INTRAMUSCULAR; INTRAVENOUS; SUBCUTANEOUS ONCE
Status: DISCONTINUED | OUTPATIENT
Start: 2020-07-25 | End: 2020-07-25

## 2020-07-25 RX ORDER — LORAZEPAM 2 MG/ML
1 INJECTION INTRAMUSCULAR ONCE
Status: COMPLETED | OUTPATIENT
Start: 2020-07-25 | End: 2020-07-25

## 2020-07-25 RX ORDER — ONDANSETRON 2 MG/ML
4 INJECTION INTRAMUSCULAR; INTRAVENOUS ONCE
Status: COMPLETED | OUTPATIENT
Start: 2020-07-25 | End: 2020-07-25

## 2020-07-25 RX ORDER — KETOROLAC TROMETHAMINE 30 MG/ML
30 INJECTION, SOLUTION INTRAMUSCULAR; INTRAVENOUS ONCE
Status: COMPLETED | OUTPATIENT
Start: 2020-07-25 | End: 2020-07-25

## 2020-07-25 RX ADMIN — IOHEXOL 45 ML: 350 INJECTION, SOLUTION INTRAVENOUS at 19:51

## 2020-07-25 RX ADMIN — LORAZEPAM 1 MG: 2 INJECTION INTRAMUSCULAR; INTRAVENOUS at 18:20

## 2020-07-25 RX ADMIN — HYDROMORPHONE HYDROCHLORIDE 0.5 MG: 1 INJECTION, SOLUTION INTRAMUSCULAR; INTRAVENOUS; SUBCUTANEOUS at 18:17

## 2020-07-25 RX ADMIN — ONDANSETRON 4 MG: 2 INJECTION INTRAMUSCULAR; INTRAVENOUS at 17:19

## 2020-07-25 RX ADMIN — KETOROLAC TROMETHAMINE 30 MG: 30 INJECTION, SOLUTION INTRAMUSCULAR at 21:33

## 2020-07-25 RX ADMIN — HYDROMORPHONE HYDROCHLORIDE 0.5 MG: 1 INJECTION, SOLUTION INTRAMUSCULAR; INTRAVENOUS; SUBCUTANEOUS at 17:20

## 2020-07-25 ASSESSMENT — PAIN DESCRIPTION - DESCRIPTORS: DESCRIPTORS: SHARP

## 2020-07-25 NOTE — ED TRIAGE NOTES
Chief Complaint   Patient presents with   • Chest Pain     Pt had a vaginal hysterectomy yesterday.  Was fine thru the night and then today awoke with a cramping in her rt chest, rt rib.  Hard for her to catch her breath.   Pt is very uncomfortable.  Pain is constant and stabbing.

## 2020-07-26 NOTE — ED NOTES
Discharge instructions given to pt. Prescriptions handed to pt at bedside. Pt educated, verbalizes understanding. All belongings accounted for. Pt wheeled out of ED with family at side to go home. PIV removed and dressing applied.

## 2020-07-26 NOTE — ED PROVIDER NOTES
ED Provider Note    Scribed for Percy York M.D. by Alfred Sotomayor. 7/25/2020  5:19 PM    Primary care provider: None noted  Means of arrival: EMS  History obtained from: Patient  History limited by: None    CHIEF COMPLAINT  Chief Complaint   Patient presents with   • Chest Pain     Pt had a vaginal hysterectomy yesterday.  Was fine thru the night and then today awoke with a cramping in her rt chest, rt rib.  Hard for her to catch her breath.       HPI  Moon Barragan is a 34 y.o. female who presents to the Emergency Department complaining of chest wall pain onset this morning. Patient underwent vaginal hysterectomy yesterday by Dr. Magana. States the pain is to the right side of her anterior inferior chest wall. The pain is exacerbated when taking deep breaths. States the pain has been progressively worsneing in severity. It is now constant and does not stop. She has been taking the percocet that she was prescribed, 1 tab every 4 hours. She has not tried taking 2 tabs at a time. States the medication only makes her sleep and has not helped with the pain. Denies any leg pain or swelling. Denies history of PE or DVT.    PPE Note: I personally donned full PPE for all patient encounters during this visit, including being clean-shaven with an N95 respirator mask, gloves, and eye protection.     Scribe remained outside the patient's room and did not have any contact with the patient for the duration of patient encounter.      REVIEW OF SYSTEMS  Pertinent positives include chest wall pain. Pertinent negatives include no leg pain or swelling.  All other systems reviewed and negative.    PAST MEDICAL HISTORY   has a past medical history of Anesthesia, Bowel habit changes, Dental disorder, and Heart burn.    SURGICAL HISTORY   has a past surgical history that includes other orthopedic surgery (Right, 02/2017); other (02/2018); pelvic examination w anesth (N/A, 7/24/2020); lap,diagnostic abdomen (N/A,  "7/24/2020); vaginal hysterectomy scope total (N/A, 7/24/2020); salpingectomy (Bilateral, 7/24/2020); cystoscopy (N/A, 7/24/2020); and incision and drainage general (N/A, 7/24/2020).    SOCIAL HISTORY  Social History     Tobacco Use   • Smoking status: Never Smoker   • Smokeless tobacco: Never Used   Substance Use Topics   • Alcohol use: Yes     Comment: rare   • Drug use: Not Currently      Social History     Substance and Sexual Activity   Drug Use Not Currently       FAMILY HISTORY  No pertinent family history reported.     CURRENT MEDICATIONS  Home Medications     Reviewed by Peyton Jackson R.N. (Registered Nurse) on 07/25/20 at 1647  Med List Status: <None>   Medication Last Dose Status   acetaminophen (TYLENOL) 325 MG Tab  Active                ALLERGIES  No Known Allergies    PHYSICAL EXAM  VITAL SIGNS: /98   Pulse (!) 102   Temp 36.4 °C (97.5 °F)   Resp 16   Ht 1.575 m (5' 2\")   Wt 78.1 kg (172 lb 2.9 oz)   LMP 07/20/2020 (Approximate)   SpO2 97%   BMI 31.49 kg/m²   Constitutional: Well developed, Well nourished, moderate to severe distress, Non-toxic appearance.   HENT: Normocephalic, Atraumatic, Bilateral external ears normal, Oropharynx moist, No oral exudates.   Eyes: PERRLA, EOMI, Conjunctiva normal, No discharge.   Neck: No tenderness, Supple, No stridor.   Lymphatic: No lymphadenopathy noted.   Cardiovascular: Normal heart rate, Normal rhythm.   Thorax & Lungs: Decreased breath sounds to bilateral bases, otherwise clear to auscultation bilaterally, No respiratory distress, No wheezing, No crackles.   Abdomen: Soft,  Mild diffuse abdominal tenderness, No masses, No pulsatile masses. Recent abdominal incisions.  Skin: Warm, Dry, No erythema, No rash.   Extremities:, No edema No cyanosis.   Musculoskeletal: No tenderness to palpation or major deformities noted.  Intact distal pulses  Neurologic: Awake, alert. Moves all extremities spontaneously.  Psychiatric: Affect normal, Judgment " normal, Mood normal.     LABS  Results for orders placed or performed during the hospital encounter of 07/25/20   CBC w/ Differential   Result Value Ref Range    WBC 13.6 (H) 4.8 - 10.8 K/uL    RBC 4.42 4.20 - 5.40 M/uL    Hemoglobin 14.0 12.0 - 16.0 g/dL    Hematocrit 41.7 37.0 - 47.0 %    MCV 94.3 81.4 - 97.8 fL    MCH 31.7 27.0 - 33.0 pg    MCHC 33.6 33.6 - 35.0 g/dL    RDW 41.4 35.9 - 50.0 fL    Platelet Count 205 164 - 446 K/uL    MPV 10.6 9.0 - 12.9 fL    Neutrophils-Polys 69.30 44.00 - 72.00 %    Lymphocytes 24.90 22.00 - 41.00 %    Monocytes 5.10 0.00 - 13.40 %    Eosinophils 0.20 0.00 - 6.90 %    Basophils 0.10 0.00 - 1.80 %    Immature Granulocytes 0.40 0.00 - 0.90 %    Nucleated RBC 0.00 /100 WBC    Neutrophils (Absolute) 9.44 (H) 2.00 - 7.15 K/uL    Lymphs (Absolute) 3.39 1.00 - 4.80 K/uL    Monos (Absolute) 0.70 0.00 - 0.85 K/uL    Eos (Absolute) 0.03 0.00 - 0.51 K/uL    Baso (Absolute) 0.02 0.00 - 0.12 K/uL    Immature Granulocytes (abs) 0.05 0.00 - 0.11 K/uL    NRBC (Absolute) 0.00 K/uL   Complete Metabolic Panel (CMP)   Result Value Ref Range    Sodium 139 135 - 145 mmol/L    Potassium 3.6 3.6 - 5.5 mmol/L    Chloride 104 96 - 112 mmol/L    Co2 22 20 - 33 mmol/L    Anion Gap 13.0 7.0 - 16.0    Glucose 101 (H) 65 - 99 mg/dL    Bun 7 (L) 8 - 22 mg/dL    Creatinine 0.58 0.50 - 1.40 mg/dL    Calcium 9.0 8.5 - 10.5 mg/dL    AST(SGOT) 17 12 - 45 U/L    ALT(SGPT) 33 2 - 50 U/L    Alkaline Phosphatase 73 30 - 99 U/L    Total Bilirubin 1.1 0.1 - 1.5 mg/dL    Albumin 4.5 3.2 - 4.9 g/dL    Total Protein 7.0 6.0 - 8.2 g/dL    Globulin 2.5 1.9 - 3.5 g/dL    A-G Ratio 1.8 g/dL   Troponin STAT   Result Value Ref Range    Troponin T <6 6 - 19 ng/L   ESTIMATED GFR   Result Value Ref Range    GFR If African American >60 >60 mL/min/1.73 m 2    GFR If Non African American >60 >60 mL/min/1.73 m 2        RADIOLOGY  CT-CTA CHEST PULMONARY ARTERY W/ RECONS   Final Result         1.  No evidence of pulmonary embolism.   2.   Mild dependent pulmonary opacities, likely atelectasis.   3.  Pneumoperitoneum which is presumably related to recent surgery.                 The radiologist's interpretation of all radiological studies have been reviewed by me.      COURSE & MEDICAL DECISION MAKING  Pertinent Labs & Imaging studies reviewed. (See chart for details)    I reviewed the patient's medical records which showed patient had a vaginal hysterectomy done yesterday by Dr. Magana.    5:19 PM - Patient seen and examined at bedside. Patient will be treated with zofran 4 mg, dilaudid injection 0.5 mg. Ordered CT-CTA chest pulmonary artery w/recons, troponin stat, CBC w/differential, CMP to evaluate her symptoms. The differential diagnoses include but are not limited to: PE, diaphragmatic air, pneumonia, chest wall pain    6:16 PM Ordered ativan injection 1 mg    Decision Making:  Patient with moderate to severe right-sided chest pain, concern is for pulmonary embolism given that it is pleuritic, could possibly be some pneumoperitoneum from the surgery.  I had a CT PE study, had to give the patient multiple doses of pain medicines along with muscle spasm medicines.  The patient's CT shows some pneumoperitoneum does not show pulmonary embolism.  The patient will be discharged home, discussed the case with Dr. Reyna who is on-call for Dr. Magana.  She recommends discharge home, anti-inflammatories, pain medicines, muscle relaxers.  We will give the patient a prescription for Motrin, Valium for a muscle relaxer, have the patient follow-up with Dr. Magana as an outpatient, have the patient return with intractable pain.        I reviewed prescription monitoring program for patient's narcotic use before prescribing a scheduled drug.The patient will not drink alcohol nor drive with prescribed medications.} The patient will return for new or worsening symptoms and is stable at the time of discharge.    The patient is referred to a primary physician for  blood pressure management, diabetic screening, and for all other preventative health concerns.        DISPOSITION:  Patient will be discharged home in stable condition.    FOLLOW UP:  [facility], Emergency Dept  [address]  [place] [id number] [phone]    If symptoms worsen    Heidi Norris M.D.  [address]  [place] [address]  [phone]            OUTPATIENT MEDICATIONS:  New Prescriptions    DIAZEPAM (VALIUM) 2 MG TAB    Take 1 Tab by mouth every 6 hours as needed (Muscle spasm) for up to 3 days.    IBUPROFEN (MOTRIN) 400 MG TAB    Take 1 Tab by mouth every 6 hours as needed.         FINAL IMPRESSION  1. Right upper quadrant abdominal pain    2. Surgical pneumoperitoneum          Alfred VIZCAINO (Scribe), am scribing for, and in the presence of, Percy York M.D..    Electronically signed by: Alfred Sotomayor (Scribe), 7/25/2020    IPercy M.D. personally performed the services described in this documentation, as scribed by Alfred Sotomayor in my presence, and it is both accurate and complete. C    The note accurately reflects work and decisions made by me.  Percy York M.D.  7/25/2020  9:34 PM

## 2021-02-19 ENCOUNTER — HOSPITAL ENCOUNTER (OUTPATIENT)
Dept: HOSPITAL 8 - CARD | Age: 36
Discharge: HOME | End: 2021-02-19
Attending: NURSE PRACTITIONER
Payer: MEDICAID

## 2021-02-19 DIAGNOSIS — M79.601: ICD-10-CM

## 2021-02-19 DIAGNOSIS — G62.9: Primary | ICD-10-CM

## 2021-02-19 DIAGNOSIS — R20.2: ICD-10-CM

## 2021-02-19 PROCEDURE — 95886 MUSC TEST DONE W/N TEST COMP: CPT

## 2021-02-19 PROCEDURE — 95908 NRV CNDJ TST 3-4 STUDIES: CPT

## 2021-08-03 ENCOUNTER — PRE-ADMISSION TESTING (OUTPATIENT)
Dept: ADMISSIONS | Facility: MEDICAL CENTER | Age: 36
End: 2021-08-03
Attending: SPECIALIST
Payer: MEDICAID

## 2021-08-03 RX ORDER — GABAPENTIN 100 MG/1
100 CAPSULE ORAL PRN
COMMUNITY
End: 2022-05-20

## 2021-08-03 RX ORDER — METHENAMINE, SODIUM PHOSPHATE, MONOBASIC, MONOHYDRATE, PHENYL SALICYLATE, METHYLENE BLUE, AND HYOSCYAMINE SULFATE 118; 40.8; 36; 10; .12 MG/1; MG/1; MG/1; MG/1; MG/1
1 CAPSULE ORAL 4 TIMES DAILY
COMMUNITY
End: 2022-05-20

## 2021-08-03 ASSESSMENT — FIBROSIS 4 INDEX: FIB4 SCORE: 0.53

## 2021-08-07 NOTE — H&P
DATE OF ADMISSION:  2021     REASON FOR SURGERY:  As stated above, symptomatic pelvic floor relaxation,   pelvic pressure, pain, dyspareunia, mixed urinary incontinence with stress   urinary incontinence demonstrated on urodynamic studies.     HISTORY OF PRESENT ILLNESS:  This is a 35-year-old woman  4, para 3,   spontaneous  1 who previously had a hysterectomy done by her previous   provider, who had a symptomatic pelvic floor relaxation, pelvic pressure and   pain.  This has worsened and she now has increasing pelvic pressure, pain,   bulge at the vaginal introitus, dyspareunia, which she attributes to her   symptomatic pelvic floor relaxation, mixed urinary incontinence symptoms,   stress urinary incontinence demonstrated on urodynamic studies, failing pelvic   floor exercise therapy, declining pessary therapy, now wishing to proceed   forward with attempt at a native tissue repair in the form of an anterior and   posterior vaginal repair, enterocele repair, perineoplasty, sacrospinous vault   suspension, mid urethral sling procedure and cystoscopy.  Risks, benefits and   alternatives of all individual portions of the surgery were addressed with   the patient in detail over several visits.  She has asked appropriate   questions, signed the appropriate consents and wished to proceed forward with   the surgery as planned.  Of note, she does state that she understands the   limitations of surgery in addressing her pelvic pressure, pain, dyspareunia   and overactive bladder symptoms, all of which may be unimproved or actually   worsen with time requiring additional medical or surgical management and even   despite these limitations of surgery, she is wishing to proceed forward with   the scheduled surgery as planned.  Of note, she does state that she   understands the limitations of surgery as described above and wishes to   proceed forward with a native tissue repair.     PAST MEDICAL HISTORY:   Noncontributory.     PAST SURGICAL HISTORY:  Hysterectomy in .     OBSTETRICAL HISTORY:  Three previous deliveries, largest infant 8 pounds   between  and .  One spontaneous .     GYNECOLOGIC HISTORY:  The patient states that she has been amenorrheic since   her hysterectomy.  She does report pelvic pressure, pain, dyspareunia, which   she attributes to her symptomatic pelvic floor relaxation, mixed urinary   incontinence symptoms with stress urinary incontinence demonstrated on   urodynamic studies, recurrent urinary tract infection.  She denies any   previous sexually transmitted diseases or pelvic infections.     FAMILY HISTORY:  Noncontributory.     REVIEW OF SYSTEMS:  Otherwise, unremarkable.     SOCIAL HISTORY:  She is .  She is a housewife.  She denies use of any   alcohol, tobacco or recreational drug use.     MEDICATIONS:  None.     ALLERGIES:  No known drug allergies.     PHYSICAL EXAMINATION:    VITAL SIGNS:  She is afebrile, hemodynamically stable.  Current vital signs   can be seen in electronic medical record.  HEART:  Regular rate and rhythm.  CHEST:  Clear to auscultation bilaterally.  ABDOMEN:  Soft, nondistended, nontender.  Bowel sounds were present.  PELVIC:  Shows grade II anterior, posterior and apical vaginal relaxation with   tenderness to palpation of the apex of the vaginal vault.  Bimanual exam   reveals no adnexal masses or tenderness to palpation.  EXTREMITIES:  Nontender.     DIAGNOSTIC DATA:  Urodynamic studies did demonstrate and confirm stress   urinary incontinence.  Transvaginal pelvic ultrasound was unremarkable.    Urinalysis within normal limits.     ASSESSMENT AND PLAN:  A 35-year-old status post a previous hysterectomy, now   with symptomatic pelvic floor relaxation, pelvic pressure, pain, stress   urinary incontinence demonstrated on urodynamic studies, electing to proceed   forward with the surgery as described above, understand limitations of    surgery.        ______________________________  MD FABIO Palomo/CHRISTY    DD:  08/06/2021 14:15  DT:  08/06/2021 15:00    Job#:  [id number]

## 2021-08-13 ENCOUNTER — PRE-ADMISSION TESTING (OUTPATIENT)
Dept: ADMISSIONS | Facility: MEDICAL CENTER | Age: 36
End: 2021-08-13
Attending: SPECIALIST
Payer: MEDICAID

## 2021-08-13 DIAGNOSIS — Z01.812 PRE-OPERATIVE LABORATORY EXAMINATION: ICD-10-CM

## 2021-08-13 LAB — COVID ORDER STATUS COVID19: NORMAL

## 2021-08-13 PROCEDURE — U0003 INFECTIOUS AGENT DETECTION BY NUCLEIC ACID (DNA OR RNA); SEVERE ACUTE RESPIRATORY SYNDROME CORONAVIRUS 2 (SARS-COV-2) (CORONAVIRUS DISEASE [COVID-19]), AMPLIFIED PROBE TECHNIQUE, MAKING USE OF HIGH THROUGHPUT TECHNOLOGIES AS DESCRIBED BY CMS-2020-01-R: HCPCS

## 2021-08-13 PROCEDURE — U0005 INFEC AGEN DETEC AMPLI PROBE: HCPCS

## 2021-08-14 LAB
SARS-COV-2 RNA RESP QL NAA+PROBE: NOTDETECTED
SPECIMEN SOURCE: NORMAL

## 2021-08-17 ENCOUNTER — HOSPITAL ENCOUNTER (OUTPATIENT)
Facility: MEDICAL CENTER | Age: 36
End: 2021-08-17
Attending: SPECIALIST | Admitting: SPECIALIST
Payer: MEDICAID

## 2021-08-17 ENCOUNTER — ANESTHESIA EVENT (OUTPATIENT)
Dept: SURGERY | Facility: MEDICAL CENTER | Age: 36
End: 2021-08-17
Payer: MEDICAID

## 2021-08-17 ENCOUNTER — ANESTHESIA (OUTPATIENT)
Dept: SURGERY | Facility: MEDICAL CENTER | Age: 36
End: 2021-08-17
Payer: MEDICAID

## 2021-08-17 VITALS
RESPIRATION RATE: 17 BRPM | HEIGHT: 62 IN | OXYGEN SATURATION: 95 % | SYSTOLIC BLOOD PRESSURE: 95 MMHG | DIASTOLIC BLOOD PRESSURE: 61 MMHG | HEART RATE: 83 BPM | TEMPERATURE: 97.4 F | BODY MASS INDEX: 31.24 KG/M2 | WEIGHT: 169.75 LBS

## 2021-08-17 PROCEDURE — 700101 HCHG RX REV CODE 250: Performed by: SPECIALIST

## 2021-08-17 PROCEDURE — 501330 HCHG SET, CYSTO IRRIG TUBING: Performed by: SPECIALIST

## 2021-08-17 PROCEDURE — 160035 HCHG PACU - 1ST 60 MINS PHASE I: Performed by: SPECIALIST

## 2021-08-17 PROCEDURE — 160002 HCHG RECOVERY MINUTES (STAT): Performed by: SPECIALIST

## 2021-08-17 PROCEDURE — 160047 HCHG PACU  - EA ADDL 30 MINS PHASE II: Performed by: SPECIALIST

## 2021-08-17 PROCEDURE — 700111 HCHG RX REV CODE 636 W/ 250 OVERRIDE (IP): Performed by: SPECIALIST

## 2021-08-17 PROCEDURE — A9270 NON-COVERED ITEM OR SERVICE: HCPCS | Performed by: ANESTHESIOLOGY

## 2021-08-17 PROCEDURE — C1771 REP DEV, URINARY, W/SLING: HCPCS | Performed by: SPECIALIST

## 2021-08-17 PROCEDURE — 160025 RECOVERY II MINUTES (STATS): Performed by: SPECIALIST

## 2021-08-17 PROCEDURE — 160036 HCHG PACU - EA ADDL 30 MINS PHASE I: Performed by: SPECIALIST

## 2021-08-17 PROCEDURE — 501838 HCHG SUTURE GENERAL: Performed by: SPECIALIST

## 2021-08-17 PROCEDURE — 502240 HCHG MISC OR SUPPLY RC 0272: Performed by: SPECIALIST

## 2021-08-17 PROCEDURE — 700111 HCHG RX REV CODE 636 W/ 250 OVERRIDE (IP): Performed by: ANESTHESIOLOGY

## 2021-08-17 PROCEDURE — 160029 HCHG SURGERY MINUTES - 1ST 30 MINS LEVEL 4: Performed by: SPECIALIST

## 2021-08-17 PROCEDURE — 700102 HCHG RX REV CODE 250 W/ 637 OVERRIDE(OP): Performed by: ANESTHESIOLOGY

## 2021-08-17 PROCEDURE — 501516 HCHG SUTURE, CAPIO: Performed by: SPECIALIST

## 2021-08-17 PROCEDURE — 160041 HCHG SURGERY MINUTES - EA ADDL 1 MIN LEVEL 4: Performed by: SPECIALIST

## 2021-08-17 PROCEDURE — 160046 HCHG PACU - 1ST 60 MINS PHASE II: Performed by: SPECIALIST

## 2021-08-17 PROCEDURE — 500892 HCHG PACK, PERI-GYN: Performed by: SPECIALIST

## 2021-08-17 PROCEDURE — 160048 HCHG OR STATISTICAL LEVEL 1-5: Performed by: SPECIALIST

## 2021-08-17 PROCEDURE — 700105 HCHG RX REV CODE 258: Performed by: SPECIALIST

## 2021-08-17 PROCEDURE — 700101 HCHG RX REV CODE 250: Performed by: ANESTHESIOLOGY

## 2021-08-17 PROCEDURE — 160009 HCHG ANES TIME/MIN: Performed by: SPECIALIST

## 2021-08-17 PROCEDURE — 700105 HCHG RX REV CODE 258: Performed by: ANESTHESIOLOGY

## 2021-08-17 DEVICE — SYSTEM SLING SOLYX SIS: Type: IMPLANTABLE DEVICE | Site: BLADDER | Status: FUNCTIONAL

## 2021-08-17 RX ORDER — ONDANSETRON 2 MG/ML
4 INJECTION INTRAMUSCULAR; INTRAVENOUS
Status: DISCONTINUED | OUTPATIENT
Start: 2021-08-17 | End: 2021-08-17 | Stop reason: HOSPADM

## 2021-08-17 RX ORDER — OXYCODONE HCL 5 MG/5 ML
5 SOLUTION, ORAL ORAL
Status: COMPLETED | OUTPATIENT
Start: 2021-08-17 | End: 2021-08-17

## 2021-08-17 RX ORDER — MEPERIDINE HYDROCHLORIDE 25 MG/ML
12.5 INJECTION INTRAMUSCULAR; INTRAVENOUS; SUBCUTANEOUS
Status: DISCONTINUED | OUTPATIENT
Start: 2021-08-17 | End: 2021-08-17 | Stop reason: HOSPADM

## 2021-08-17 RX ORDER — HALOPERIDOL 5 MG/ML
1 INJECTION INTRAMUSCULAR
Status: DISCONTINUED | OUTPATIENT
Start: 2021-08-17 | End: 2021-08-17 | Stop reason: HOSPADM

## 2021-08-17 RX ORDER — OXYCODONE HCL 5 MG/5 ML
10 SOLUTION, ORAL ORAL
Status: COMPLETED | OUTPATIENT
Start: 2021-08-17 | End: 2021-08-17

## 2021-08-17 RX ORDER — HYDRALAZINE HYDROCHLORIDE 20 MG/ML
5 INJECTION INTRAMUSCULAR; INTRAVENOUS
Status: DISCONTINUED | OUTPATIENT
Start: 2021-08-17 | End: 2021-08-17 | Stop reason: HOSPADM

## 2021-08-17 RX ORDER — KETOROLAC TROMETHAMINE 30 MG/ML
INJECTION, SOLUTION INTRAMUSCULAR; INTRAVENOUS PRN
Status: DISCONTINUED | OUTPATIENT
Start: 2021-08-17 | End: 2021-08-17 | Stop reason: SURG

## 2021-08-17 RX ORDER — BUPIVACAINE HYDROCHLORIDE AND EPINEPHRINE 2.5; 5 MG/ML; UG/ML
INJECTION, SOLUTION EPIDURAL; INFILTRATION; INTRACAUDAL; PERINEURAL
Status: DISCONTINUED | OUTPATIENT
Start: 2021-08-17 | End: 2021-08-17 | Stop reason: HOSPADM

## 2021-08-17 RX ORDER — ONDANSETRON 2 MG/ML
INJECTION INTRAMUSCULAR; INTRAVENOUS PRN
Status: DISCONTINUED | OUTPATIENT
Start: 2021-08-17 | End: 2021-08-17 | Stop reason: SURG

## 2021-08-17 RX ORDER — GENTAMICIN SULFATE 40 MG/ML
INJECTION, SOLUTION INTRAMUSCULAR; INTRAVENOUS
Status: DISCONTINUED | OUTPATIENT
Start: 2021-08-17 | End: 2021-08-17 | Stop reason: HOSPADM

## 2021-08-17 RX ORDER — DEXAMETHASONE SODIUM PHOSPHATE 4 MG/ML
INJECTION, SOLUTION INTRA-ARTICULAR; INTRALESIONAL; INTRAMUSCULAR; INTRAVENOUS; SOFT TISSUE PRN
Status: DISCONTINUED | OUTPATIENT
Start: 2021-08-17 | End: 2021-08-17 | Stop reason: SURG

## 2021-08-17 RX ORDER — HYDROMORPHONE HYDROCHLORIDE 1 MG/ML
0.1 INJECTION, SOLUTION INTRAMUSCULAR; INTRAVENOUS; SUBCUTANEOUS
Status: DISCONTINUED | OUTPATIENT
Start: 2021-08-17 | End: 2021-08-17 | Stop reason: HOSPADM

## 2021-08-17 RX ORDER — HYDROMORPHONE HYDROCHLORIDE 1 MG/ML
0.4 INJECTION, SOLUTION INTRAMUSCULAR; INTRAVENOUS; SUBCUTANEOUS
Status: DISCONTINUED | OUTPATIENT
Start: 2021-08-17 | End: 2021-08-17 | Stop reason: HOSPADM

## 2021-08-17 RX ORDER — SODIUM CHLORIDE, SODIUM LACTATE, POTASSIUM CHLORIDE, CALCIUM CHLORIDE 600; 310; 30; 20 MG/100ML; MG/100ML; MG/100ML; MG/100ML
INJECTION, SOLUTION INTRAVENOUS CONTINUOUS
Status: DISCONTINUED | OUTPATIENT
Start: 2021-08-17 | End: 2021-08-17 | Stop reason: HOSPADM

## 2021-08-17 RX ORDER — PROMETHAZINE HYDROCHLORIDE 25 MG/1
12.5 SUPPOSITORY RECTAL EVERY 4 HOURS PRN
Status: DISCONTINUED | OUTPATIENT
Start: 2021-08-17 | End: 2021-08-17 | Stop reason: HOSPADM

## 2021-08-17 RX ORDER — HYDROMORPHONE HYDROCHLORIDE 1 MG/ML
0.2 INJECTION, SOLUTION INTRAMUSCULAR; INTRAVENOUS; SUBCUTANEOUS
Status: DISCONTINUED | OUTPATIENT
Start: 2021-08-17 | End: 2021-08-17 | Stop reason: HOSPADM

## 2021-08-17 RX ORDER — EPINEPHRINE 1 MG/ML(1)
AMPUL (ML) INJECTION
Status: DISCONTINUED
Start: 2021-08-17 | End: 2021-08-17 | Stop reason: HOSPADM

## 2021-08-17 RX ORDER — CEFAZOLIN SODIUM 1 G/3ML
INJECTION, POWDER, FOR SOLUTION INTRAMUSCULAR; INTRAVENOUS PRN
Status: DISCONTINUED | OUTPATIENT
Start: 2021-08-17 | End: 2021-08-17 | Stop reason: SURG

## 2021-08-17 RX ORDER — LIDOCAINE HYDROCHLORIDE 20 MG/ML
INJECTION, SOLUTION EPIDURAL; INFILTRATION; INTRACAUDAL; PERINEURAL PRN
Status: DISCONTINUED | OUTPATIENT
Start: 2021-08-17 | End: 2021-08-17 | Stop reason: SURG

## 2021-08-17 RX ORDER — SCOLOPAMINE TRANSDERMAL SYSTEM 1 MG/1
PATCH, EXTENDED RELEASE TRANSDERMAL
Status: COMPLETED
Start: 2021-08-17 | End: 2021-08-17

## 2021-08-17 RX ORDER — GENTAMICIN SULFATE 40 MG/ML
INJECTION, SOLUTION INTRAMUSCULAR; INTRAVENOUS
Status: DISCONTINUED
Start: 2021-08-17 | End: 2021-08-17 | Stop reason: HOSPADM

## 2021-08-17 RX ORDER — DIPHENHYDRAMINE HYDROCHLORIDE 50 MG/ML
6.25 INJECTION INTRAMUSCULAR; INTRAVENOUS
Status: DISCONTINUED | OUTPATIENT
Start: 2021-08-17 | End: 2021-08-17 | Stop reason: HOSPADM

## 2021-08-17 RX ORDER — ACETAMINOPHEN 500 MG
1000 TABLET ORAL EVERY 4 HOURS PRN
Status: DISCONTINUED | OUTPATIENT
Start: 2021-08-17 | End: 2021-08-17 | Stop reason: HOSPADM

## 2021-08-17 RX ORDER — SIMETHICONE 80 MG
80 TABLET,CHEWABLE ORAL EVERY 8 HOURS PRN
Status: DISCONTINUED | OUTPATIENT
Start: 2021-08-17 | End: 2021-08-17 | Stop reason: HOSPADM

## 2021-08-17 RX ORDER — SCOLOPAMINE TRANSDERMAL SYSTEM 1 MG/1
PATCH, EXTENDED RELEASE TRANSDERMAL PRN
Status: DISCONTINUED | OUTPATIENT
Start: 2021-08-17 | End: 2021-08-17 | Stop reason: SURG

## 2021-08-17 RX ORDER — BUPIVACAINE HYDROCHLORIDE 2.5 MG/ML
INJECTION, SOLUTION EPIDURAL; INFILTRATION; INTRACAUDAL
Status: DISCONTINUED
Start: 2021-08-17 | End: 2021-08-17 | Stop reason: HOSPADM

## 2021-08-17 RX ORDER — SODIUM CHLORIDE, SODIUM LACTATE, POTASSIUM CHLORIDE, CALCIUM CHLORIDE 600; 310; 30; 20 MG/100ML; MG/100ML; MG/100ML; MG/100ML
INJECTION, SOLUTION INTRAVENOUS CONTINUOUS
Status: ACTIVE | OUTPATIENT
Start: 2021-08-17 | End: 2021-08-17

## 2021-08-17 RX ORDER — MIDAZOLAM HYDROCHLORIDE 1 MG/ML
INJECTION INTRAMUSCULAR; INTRAVENOUS PRN
Status: DISCONTINUED | OUTPATIENT
Start: 2021-08-17 | End: 2021-08-17 | Stop reason: SURG

## 2021-08-17 RX ADMIN — FENTANYL CITRATE 50 MCG: 50 INJECTION, SOLUTION INTRAMUSCULAR; INTRAVENOUS at 11:35

## 2021-08-17 RX ADMIN — FENTANYL CITRATE 50 MCG: 50 INJECTION, SOLUTION INTRAMUSCULAR; INTRAVENOUS at 11:30

## 2021-08-17 RX ADMIN — SODIUM CHLORIDE, POTASSIUM CHLORIDE, SODIUM LACTATE AND CALCIUM CHLORIDE: 600; 310; 30; 20 INJECTION, SOLUTION INTRAVENOUS at 10:50

## 2021-08-17 RX ADMIN — ACETAMINOPHEN 1000 MG: 500 TABLET ORAL at 11:32

## 2021-08-17 RX ADMIN — SODIUM CHLORIDE, POTASSIUM CHLORIDE, SODIUM LACTATE AND CALCIUM CHLORIDE: 600; 310; 30; 20 INJECTION, SOLUTION INTRAVENOUS at 08:23

## 2021-08-17 RX ADMIN — CEFAZOLIN 2 G: 330 INJECTION, POWDER, FOR SOLUTION INTRAMUSCULAR; INTRAVENOUS at 09:22

## 2021-08-17 RX ADMIN — PROPOFOL 25 MCG/KG/MIN: 10 INJECTION, EMULSION INTRAVENOUS at 09:29

## 2021-08-17 RX ADMIN — MEPERIDINE HYDROCHLORIDE 12.5 MG: 25 INJECTION INTRAMUSCULAR; INTRAVENOUS; SUBCUTANEOUS at 10:51

## 2021-08-17 RX ADMIN — ONDANSETRON 4 MG: 2 INJECTION INTRAMUSCULAR; INTRAVENOUS at 09:44

## 2021-08-17 RX ADMIN — SCOPALAMINE 1 PATCH: 1 PATCH, EXTENDED RELEASE TRANSDERMAL at 09:25

## 2021-08-17 RX ADMIN — LIDOCAINE HYDROCHLORIDE 60 MG: 20 INJECTION, SOLUTION EPIDURAL; INFILTRATION; INTRACAUDAL at 09:22

## 2021-08-17 RX ADMIN — DEXAMETHASONE SODIUM PHOSPHATE 8 MG: 4 INJECTION, SOLUTION INTRA-ARTICULAR; INTRALESIONAL; INTRAMUSCULAR; INTRAVENOUS; SOFT TISSUE at 09:25

## 2021-08-17 RX ADMIN — PROPOFOL 200 MG: 10 INJECTION, EMULSION INTRAVENOUS at 09:22

## 2021-08-17 RX ADMIN — FENTANYL CITRATE 50 MCG: 50 INJECTION, SOLUTION INTRAMUSCULAR; INTRAVENOUS at 09:22

## 2021-08-17 RX ADMIN — OXYCODONE HYDROCHLORIDE 10 MG: 5 SOLUTION ORAL at 11:32

## 2021-08-17 RX ADMIN — MIDAZOLAM HYDROCHLORIDE 2 MG: 1 INJECTION, SOLUTION INTRAMUSCULAR; INTRAVENOUS at 09:19

## 2021-08-17 RX ADMIN — KETOROLAC TROMETHAMINE 30 MG: 30 INJECTION, SOLUTION INTRAMUSCULAR at 09:44

## 2021-08-17 RX ADMIN — FENTANYL CITRATE 50 MCG: 50 INJECTION, SOLUTION INTRAMUSCULAR; INTRAVENOUS at 09:43

## 2021-08-17 RX ADMIN — FENTANYL CITRATE 50 MCG: 50 INJECTION, SOLUTION INTRAMUSCULAR; INTRAVENOUS at 09:32

## 2021-08-17 ASSESSMENT — PAIN DESCRIPTION - PAIN TYPE
TYPE: SURGICAL PAIN
TYPE: ACUTE PAIN;SURGICAL PAIN
TYPE: SURGICAL PAIN
TYPE: SURGICAL PAIN

## 2021-08-17 NOTE — OR NURSING
0955: Pt arrived from OR to bay #4. ID verified. Report received. Connected to monitor. 6L O2 via mask with oral airway. Wright draining to gravity. Peripad in place with no vaginal bleeding noted.     1020: Oral airway removed, unlabored breathing.    1038: weaned to 2L O2 via mask     1045: warm blankets and eleanor hugger applied for shivering    1051: IV demerol given for shivering    1100: telephone updates to spouse using     1130: IV fentanyl given    1135: repeat IV fentanyl given. PO oxycodone given.    1215: phase 2 criteria met    1300: pt dressed and sitting up in recliner chair. Pain manageable. No nausea.    1308: handoff to EDGAR Lyman. Spouse notified to return to facility.

## 2021-08-17 NOTE — ANESTHESIA TIME REPORT
Anesthesia Start and Stop Event Times     Date Time Event    8/17/2021 0906 Ready for Procedure     0918 Anesthesia Start     0956 Anesthesia Stop        Responsible Staff  08/17/21    Name Role Begin End    Jewel Crawford M.D. Anesth 0918 0956        Preop Diagnosis (Free Text):  Pre-op Diagnosis     STRESS URINARY INCONTINENCE, DYSPAREUINA, PELVIC PAIN, CYSTOCELE        Preop Diagnosis (Codes):    Post op Diagnosis  Urinary, incontinence, stress female      Premium Reason  Non-Premium    Comments:

## 2021-08-17 NOTE — ANESTHESIA PROCEDURE NOTES
Airway    Date/Time: 8/17/2021 9:22 AM  Performed by: Jewel Crawford M.D.  Authorized by: Jewel Crawford M.D.     Location:  OR  Urgency:  Elective  Difficult Airway: No    Indications for Airway Management:  Anesthesia      Spontaneous Ventilation: absent    Sedation Level:  Deep  Preoxygenated: Yes    Mask Difficulty Assessment:  1 - vent by mask  Final Airway Type:  Supraglottic airway  Final Supraglottic Airway:  Standard LMA    SGA Size:  3  Number of Attempts at Approach:  1

## 2021-08-17 NOTE — OR SURGEON
Immediate Post OP Note    PreOp Diagnosis: Pelvic pain, symptomatic pelvic floor relaxation,   pelvic pressure, pain, dyspareunia, mixed urinary incontinence with stress   urinary incontinence demonstrated on urodynamic studies.      PostOp Diagnosis: Same      Procedure(s):  COLPORRHAPHY, COMBINED ANTEROPOSTERIOR - WITH PERINEOPLASTY - Wound Class: Clean Contaminated  REPAIR, ENTEROCELE - Wound Class: Clean Contaminated  BLADDER SLING, FEMALE - MIDURETHRAL SLING - Wound Class: Clean Contaminated  COLPOPEXY - SACROSPINOUS VAULT SUSPENSION - Wound Class: Clean Contaminated  CYSTOSCOPY. - Wound Class: Clean Contaminated    Surgeon(s):  OLIVIER Lamb M.D.    Anesthesiologist/Type of Anesthesia:  Anesthesiologist: Jewel Crawford M.D./General    Surgical Staff:  Circulator: Nelly Becerra R.N.; Maggie Mcclelland R.N.  Scrub Person: Billie Bianchi    Specimens removed if any:  None    Estimated Blood Loss: 100 ccs    Findings: Good support of the anterior and the posterior and the apical vaginal tissue without any undue tension at any suture sites, cystoscopy revealed bilateral ureteral efflux, normal bladder and no undue tension at the mid urethra after sling placement.    Complications: None        8/17/2021 9:51 AM Santos Martinez M.D.

## 2021-08-17 NOTE — DISCHARGE INSTRUCTIONS
ACTIVITY: Rest and take it easy for the first 24 hours.  A responsible adult is recommended to remain with you during that time.  It is normal to feel sleepy.  We encourage you to not do anything that requires balance, judgment or coordination.    MILD FLU-LIKE SYMPTOMS ARE NORMAL. YOU MAY EXPERIENCE GENERALIZED MUSCLE ACHES, THROAT IRRITATION, HEADACHE AND/OR SOME NAUSEA.    FOR 24 HOURS DO NOT:  Drive, operate machinery or run household appliances.  Drink beer or alcoholic beverages.   Make important decisions or sign legal documents.    SPECIAL INSTRUCTIONS: SEE ATTACHED HANDOUT    DIET: No restrictions, may resume normal diet. To avoid nausea, slowly advance diet as tolerated, avoiding spicy or greasy foods for the first day.  Add more substantial food to your diet according to your physician's instructions. INCREASE FLUIDS AND FIBER TO AVOID CONSTIPATION.    SURGICAL DRESSING/BATHING: OK to shower tomorrow after goodwin catheter removed. No submerging in baths or any other water until Dr. Martinez clears you.      FOLLOW-UP APPOINTMENT: GO TO DR. MARTINEZ'S OFFICE TOMORROW 8/18/21 FOR GOODWIN CATHETER REMOVAL BETWEEN 9AM-11AM  · A follow-up appointment should be arranged with your doctor in 2 WEEKS; call to schedule.    You should CALL YOUR PHYSICIAN if you develop:  Fever greater than 101 degrees F.  Pain not relieved by medication, or persistent nausea or vomiting.  Excessive bleeding (blood soaking through dressing) or unexpected drainage from the wound.  Extreme redness or swelling around the incision site, drainage of pus or foul smelling drainage.  Inability to urinate or empty your bladder within 8 hours.  Problems with breathing or chest pain.    You should call 911 if you develop problems with breathing or chest pain.  If you are unable to contact your doctor or surgical center, you should go to the nearest emergency room or urgent care center.      Dr. Martinez's telephone #: [phone]    If any questions  arise, call your doctor.  If your doctor is not available, please feel free to call the Surgical Center at [phone]. The Contact Center is open Monday through Friday 7AM to 5PM and may speak to a nurse at [phone], or toll free at [phone].     A registered nurse may call you a few days after your surgery to see how you are doing after your procedure.    MEDICATIONS: Resume taking daily medication.  Take prescribed pain medication with food.  If no medication is prescribed, you may take non-aspirin pain medication if needed.  PAIN MEDICATION CAN BE VERY CONSTIPATING.  Take a stool softener or laxative such as senokot, pericolace, or milk of magnesia if needed.    Prescription AT HOME ALREADY.  Last pain medication given at _________________________.  · If Dr. Martinez has prescribed pain medication that includes Acetaminophen (Tylenol), do not take additional Acetaminophen (Tylenol) while taking the prescribed medication.    Depression / Suicide Risk    As you are discharged from this [facility] facility, it is important to learn how to keep safe from harming yourself.    Recognize the warning signs:  · Abrupt changes in personality, positive or negative- including increase in energy   · Giving away possessions  · Change in eating patterns- significant weight changes-  positive or negative  · Change in sleeping patterns- unable to sleep or sleeping all the time   · Unwillingness or inability to communicate  · Depression  · Unusual sadness, discouragement and loneliness  · Talk of wanting to die  · Neglect of personal appearance   · Rebelliousness- reckless behavior  · Withdrawal from people/activities they love  · Confusion- inability to concentrate     If you or a loved one observes any of these behaviors or has concerns about self-harm, here's what you can do:  · Talk about it- your feelings and reasons for harming yourself  · Remove any means that you might use to hurt yourself (examples: pills,  rope, extension cords, firearm)  · Get professional help from the community (Mental Health, Substance Abuse, psychological counseling)  · Do not be alone:Call your Safe Contact- someone whom you trust who will be there for you.  · Call your local CRISIS HOTLINE 087-4537 or 070-283-5605  · Call your local Children's Mobile Crisis Response Team [place] (137) 686-2414 or www.Artklikk  · Call the toll free National Suicide Prevention Hotlines   · National Suicide Prevention Lifeline 721-499-CWSQ (7975)  · National Hope Line Network 800-SUICIDE (806-0600)

## 2021-08-17 NOTE — ANESTHESIA PREPROCEDURE EVALUATION
Past Medical History:   Diagnosis Date   • Anesthesia     pt's father has woken up during surgery   • Bowel habit changes     constipation   • Delayed emergence from general anesthesia 07/2020    pt slow to wake up post op nausea chills and fever   • Dental disorder     upper denture/ implanted   • Heart burn          Relevant Problems   No relevant active problems       Physical Exam    Airway   Mallampati: II  TM distance: >3 FB  Neck ROM: full       Cardiovascular - normal exam  Rhythm: regular  Rate: normal  (-) murmur     Dental - normal exam  (+) upper dentures           Pulmonary - normal exam  Breath sounds clear to auscultation     Abdominal    Neurological - normal exam                 Anesthesia Plan    ASA 2       Plan - general       Airway plan will be LMA          Induction: intravenous    Postoperative Plan: Postoperative administration of opioids is intended.    Pertinent diagnostic labs and testing reviewed    Informed Consent:    Anesthetic plan and risks discussed with patient.

## 2021-08-17 NOTE — PROGRESS NOTES
1308- Report received from EDGAR Brannon. Patient is sleepy and sitting up in the recliner. Vital signs are stable. Patient is on room air.    1320-Provided patient with discharge education with  at bedside. All questions answered.    1336- Patient feels ready to be discharged and mets discharge criteria set by Dr. Martinez. PIV removed and patient discharged home via wheelchair.

## 2021-08-17 NOTE — OP REPORT
DATE OF SERVICE:  8/17/2021      PREOPERATIVE DIAGNOSES:  symptomatic pelvic floor relaxation,   pelvic pressure, pain, dyspareunia, mixed urinary incontinence with stress   urinary incontinence demonstrated on urodynamic studies.     POSTOPERATIVE DIAGNOSES: Same     PROCEDURES PERFORMED:  Anterior and posterior vaginal repair, enterocele    repair, sacrospinous vault suspension, Solyx midurethral sling    procedure, cystoscopy.     SURGEON:  Santos Martinez MD     ASSISTANT:  Brian Laws MD     ANESTHESIA:  General     ANESTHESIOLOGIST:  Anesthesiologist: Jewel Crawford M.D.      ESTIMATED BLOOD LOSS FOR THE PROCEDURE:  Less than 15 mL.     FINDINGS:  Good support of the anterior and posterior vaginal walls in    addition to the apical vaginal tissue without any undue tension in the suture    sites.  Cystoscopy revealed bilateral ureteral efflux and normal bladder and    no undue tension noted at the level of the mid urethra after sling placement    on cystoscopic evaluation.     DESCRIPTION OF PROCEDURE:  The patient was taken to the operating room where general anesthesia was performed without difficulty.  Patient was then prepped   and draped in usual sterile fashion.  Lower extremities placed in Jesús stirrups.    Attention was first turned to the perineum where a weighted speculum was placed with the use of Scruggs retractor.  Anterior vaginal mucosa was then injected with 10 mL of 0.25% Marcaine with epinephrine.  The vaginal mucosa was then dissected off the underlying pubocervical fascia bessy until the levator muscles were then reached.  A separate incision just inferior the urethral meatus was made after injecting 5 ccs of 0.25% Marcaine with epinephrine before dissecting the vaginal mucosa off of the pubocervical fascia and placement of the Solyx transducer loaded with the sling and passed through the incision at the mid urethra and placed through the endopelvic fascia and then into the Obturator internal  muscle and released the opposite side was then loaded on the transducer and the opposite was placed into the Obturator internus muscle and before releasing the sling the Wright catheter was removed and a 30 degree cystoscope was placed with bilateral ureteral efflux, normal bladder and no undue tension at the mid urethra the sling was then released from the transducer and the tensioning was finalized under cystoscopic evaluation. The sling was then released, tensioning of position was then finalized under direct cystoscopic evaluation.  Cystoscope removed.  Wright catheter replaced.  All excess vaginal mucosa.  The vaginal mucosa was then reapproximated with 2-0 Vicryl in a running interlocking fashion in one segment.  Posterior vaginal mucosa was then injected with 10 mL of 0.25% Marcaine with epinephrine.  The vaginal mucosa was then dissected off the underlying rectovaginal fascia bessy until the levator muscles were then reached.   Horizontal mattress sutures were then used to reapproximate the rectovaginal fascia in the midline in a double 0 closure, thereby reducing the midline rectocele.  A large enterocele was located at the superior aspect of the    dissection.  Dissection of the sacrospinous process was then performed in the ischial spine, 3 cm medial along the sacrospinous ligament with straight catheterization needle device, 0 Ethibond suture was taken through the sacrospinous ligament taken out through the right apical portion of the vaginal cuff and the overlying vaginal mucosa.  Once tied down, there was good reapproximation of the apex of the vaginal vault to the sacrospinous ligament.  The rectovaginal septum was then reapproximated in the posterior    aspect of the vaginal cuff in a double pursestring suture, thereby reducing the large enterocele located at the superior aspect of the dissection.  All excess vaginal mucosa was then trimmed.  The vaginal mucosa was then reapproximated with 2-0 Vicryl in  running locking fashion in one segment performing a perineoplasty on the perineum.    The patient tolerated procedure well and was awoken from general anesthesia, was taken to the recovery in stable condition.     ____________________________________   HOLLIE HELLER MD

## 2021-08-17 NOTE — ANESTHESIA POSTPROCEDURE EVALUATION
Patient: Moon Barragan    Procedure Summary     Date: 08/17/21 Room / Location: [facility] ROOM 23 / SURGERY SAME DAY [facility]    Anesthesia Start: 0918 Anesthesia Stop: 0956    Procedures:       COLPORRHAPHY, COMBINED ANTEROPOSTERIOR - WITH PERINEOPLASTY (N/A Vagina )      REPAIR, ENTEROCELE (N/A Vagina )      BLADDER SLING, FEMALE - MIDURETHRAL SLING (N/A Vagina )      COLPOPEXY - SACROSPINOUS VAULT SUSPENSION (N/A Vagina )      CYSTOSCOPY. (N/A Bladder) Diagnosis: (STRESS URINARY INCONTINENCE, DYSPAREUINA, PELVIC PAIN, CYSTOCELE)    Surgeons: Santos Martinez M.D. Responsible Provider: Jewel Crawford M.D.    Anesthesia Type: general ASA Status: 2          Final Anesthesia Type: general  Last vitals  BP   Blood Pressure: 113/69    Temp   36.3 °C (97.4 °F)    Pulse   67   Resp   14    SpO2   100 %      Anesthesia Post Evaluation    Patient location during evaluation: PACU  Patient participation: complete - patient participated  Level of consciousness: awake and alert    Airway patency: patent  Anesthetic complications: no  Cardiovascular status: hemodynamically stable  Respiratory status: acceptable  Hydration status: euvolemic    PONV: none          No complications documented.     Nurse Pain Score: 0 (NPRS)

## 2022-05-20 ENCOUNTER — HOSPITAL ENCOUNTER (EMERGENCY)
Facility: MEDICAL CENTER | Age: 37
End: 2022-05-20
Attending: EMERGENCY MEDICINE
Payer: MEDICAID

## 2022-05-20 VITALS
TEMPERATURE: 97.1 F | WEIGHT: 161.6 LBS | DIASTOLIC BLOOD PRESSURE: 82 MMHG | HEART RATE: 75 BPM | RESPIRATION RATE: 19 BRPM | HEIGHT: 62 IN | SYSTOLIC BLOOD PRESSURE: 130 MMHG | BODY MASS INDEX: 29.74 KG/M2 | OXYGEN SATURATION: 98 %

## 2022-05-20 DIAGNOSIS — S16.1XXA STRAIN OF NECK MUSCLE, INITIAL ENCOUNTER: ICD-10-CM

## 2022-05-20 DIAGNOSIS — S09.90XA CLOSED HEAD INJURY, INITIAL ENCOUNTER: ICD-10-CM

## 2022-05-20 PROCEDURE — 700102 HCHG RX REV CODE 250 W/ 637 OVERRIDE(OP): Performed by: EMERGENCY MEDICINE

## 2022-05-20 PROCEDURE — 99283 EMERGENCY DEPT VISIT LOW MDM: CPT

## 2022-05-20 PROCEDURE — A9270 NON-COVERED ITEM OR SERVICE: HCPCS | Performed by: EMERGENCY MEDICINE

## 2022-05-20 PROCEDURE — 700111 HCHG RX REV CODE 636 W/ 250 OVERRIDE (IP): Performed by: EMERGENCY MEDICINE

## 2022-05-20 PROCEDURE — 96372 THER/PROPH/DIAG INJ SC/IM: CPT

## 2022-05-20 RX ORDER — SUMATRIPTAN 6 MG/.5ML
6 INJECTION, SOLUTION SUBCUTANEOUS ONCE
Status: COMPLETED | OUTPATIENT
Start: 2022-05-20 | End: 2022-05-20

## 2022-05-20 RX ORDER — IBUPROFEN 600 MG/1
600 TABLET ORAL ONCE
Status: COMPLETED | OUTPATIENT
Start: 2022-05-20 | End: 2022-05-20

## 2022-05-20 RX ORDER — ACETAMINOPHEN 500 MG
1000 TABLET ORAL ONCE
Status: COMPLETED | OUTPATIENT
Start: 2022-05-20 | End: 2022-05-20

## 2022-05-20 RX ADMIN — IBUPROFEN 600 MG: 600 TABLET, FILM COATED ORAL at 16:45

## 2022-05-20 RX ADMIN — SUMATRIPTAN 6 MG: 6 INJECTION, SOLUTION SUBCUTANEOUS at 16:45

## 2022-05-20 RX ADMIN — ACETAMINOPHEN 1000 MG: 500 TABLET ORAL at 16:45

## 2022-05-20 ASSESSMENT — FIBROSIS 4 INDEX: FIB4 SCORE: 0.55

## 2022-05-20 NOTE — ED NOTES
Patient medicated per orders.     Discharge instructions given to patient she verbalized understanding. Patient ambulated out of department with steady gait.

## 2022-05-20 NOTE — ED TRIAGE NOTES
Patient to ED with complaints of headache, neck pain, nausea, photosensitivity follow a mirror falling on head this am. She states she remembers the event. She has tenderness to right posterior head and neck.     Pt educated on ED process and asked to wait in lobby. Patient educated on importance of alerting staff to new or worsening symptoms or concerns.

## 2022-05-20 NOTE — ED PROVIDER NOTES
ED Provider  Scribed for Cristino Mueller D.O. by Suresh Miranda. 5/20/2022  3:59 PM    Means of arrival:Walk in  History obtained from:Patient  History limited by: None    CHIEF COMPLAINT  Chief Complaint   Patient presents with    T-5000    Head Injury    Neck Pain       HPI  Moon Barragan is a 36 y.o. female who presents for evaluation of a head injury onset this morning. Patient states she was in her daughter's closet when a mirror fell off the wall and hit her in the head. She endorses associated pain to the back of her head, spasms, nausea, light aversion, and headache, but denies any vomiting, loss of consciousness, vision changes, bleeding, numbness, weakness, or seizure. Patient states her pain is exacerbated when lying down. She denies taking any blood thinners or having a history of headaches or migraines.    REVIEW OF SYSTEMS  See HPI for further details. All other systems are negative.   C    PAST MEDICAL HISTORY   has a past medical history of Anesthesia, Bowel habit changes, Delayed emergence from general anesthesia (07/2020), Dental disorder, Heart burn, and PONV (postoperative nausea and vomiting).    SOCIAL HISTORY  Social History     Tobacco Use    Smoking status: Never Smoker    Smokeless tobacco: Never Used   Vaping Use    Vaping Use: Never used   Substance and Sexual Activity    Alcohol use: Yes     Comment: rare    Drug use: Never    Sexual activity: None noted when reviewed       SURGICAL HISTORY   has a past surgical history that includes pelvic examination w anesth (N/A, 7/24/2020); lap,diagnostic abdomen (N/A, 7/24/2020); cystoscopy (N/A, 7/24/2020); incision and drainage general (N/A, 7/24/2020); vaginal hysterectomy scope total (N/A, 7/24/2020); salpingectomy (Bilateral, 7/24/2020); other orthopedic surgery (Right, 02/2017); other (02/2018); combined ant/post colporrhaphy (N/A, 8/17/2021); cystourethroscopy (N/A, 8/17/2021); enterocele repair (N/A, 8/17/2021); bladder sling female  "(N/A, 8/17/2021); and vaginal suspension (N/A, 8/17/2021).    CURRENT MEDICATIONS  Home Medications       Reviewed by Rosmery Gan R.N. (Registered Nurse) on 05/20/22 at 1502  Med List Status: Complete     Medication Last Dose Status        Patient Kt Taking any Medications                           ALLERGIES  No Known Allergies    PHYSICAL EXAM  VITAL SIGNS: /83   Pulse 74   Temp 36.6 °C (97.9 °F) (Temporal)   Resp 16   Ht 1.575 m (5' 2\")   Wt 73.3 kg (161 lb 9.6 oz)   LMP 07/20/2020 (Approximate)   SpO2 100%   BMI 29.56 kg/m²   Constitutional: Alert in no apparent distress.  HENT: Tenderness to right parietal area with no hematoma or laceration. No signs of trauma, mucous membranes are moist  Eyes: Conjunctiva normal, Non-icteric.   Neck: Normal range of motion, No tenderness, Supple.  Lymphatic: No lymphadenopathy noted.   Cardiovascular: Regular rate and rhythm, no murmurs.   Thorax & Lungs: Normal breath sounds, No respiratory distress, No wheezing, No chest tenderness.   Abdomen: Bowel sounds normal, Soft, No tenderness, No masses, No pulsatile masses. No peritoneal signs.  Skin: Warm, Dry, normal color.   Back: No bony tenderness, No CVA tenderness.   Extremities: No edema, No tenderness, No cyanosis  Musculoskeletal: Good range of motion in all major joints. No tenderness to palpation or major deformities noted.   Neurologic: Alert and oriented x4, Normal motor function, Normal sensory function, No focal deficits noted.   Psychiatric: Affect normal, Judgment normal, Mood normal.     DIAGNOSTIC STUDIES / PROCEDURES    COURSE  Pertinent Labs & Imaging studies reviewed. (See chart for details)    3:59 PM - Patient seen and examined at bedside. Discussed plan of care. The patient will be medicated with Tylenol 1000 mg, Motrin 600 mg, and Imitrex 6 mg. Informed the patient the likelihood of her experiencing a brain bleed is minimal given her symptoms. Discussed plans for discharge and " return precautions. Patient verbalizes understanding and agreement to this plan of care.      MEDICAL DECISION MAKING  This is a 36 y.o. female who presents with an injury to the right side of the scalp.  Mirror fell.  The mirror did not break there was no break in the skin.    Did not get knocked out she had no vomiting her neurological exam is normal.  According to the [place] head CT evaluation she does not meet criteria or need for CT scan.  She is discharged home with symptomatic treatment.      The patient will return for new or worsening symptoms and is stable at the time of discharge.    The patient is referred to a primary physician for blood pressure management, diabetic screening, and for all other preventative health concerns.    DISPOSITION:  Patient will be discharged home in stable condition.    FOLLOW UP:  CORRINA BalesRPhyllisNPhyllis  [address]  [address]  [facility] [id number] [phone]    In 3 days      FINAL IMPRESSION  1. Strain of neck muscle, initial encounter    2. Closed head injury, initial encounter         Suresh VIZCAINO (Alysonibe), am scribing for, and in the presence of, Cristino Mueller D.O..    Electronically signed by: Suresh Miranda (Alysonibe), 5/20/2022    ICristino D.O. personally performed the services described in this documentation, as scribed by Suresh Miranda in my presence, and it is both accurate and complete.    The note accurately reflects work and decisions made by me.  Cristino Mueller D.O.  5/20/2022  5:51 PM

## 2025-07-06 ENCOUNTER — OFFICE VISIT (OUTPATIENT)
Dept: URGENT CARE | Facility: PHYSICIAN GROUP | Age: 40
End: 2025-07-06
Payer: COMMERCIAL

## 2025-07-06 VITALS
TEMPERATURE: 98.1 F | HEIGHT: 62 IN | SYSTOLIC BLOOD PRESSURE: 118 MMHG | WEIGHT: 169 LBS | HEART RATE: 109 BPM | BODY MASS INDEX: 31.1 KG/M2 | RESPIRATION RATE: 16 BRPM | DIASTOLIC BLOOD PRESSURE: 58 MMHG | OXYGEN SATURATION: 99 %

## 2025-07-06 DIAGNOSIS — J06.9 VIRAL URI WITH COUGH: ICD-10-CM

## 2025-07-06 LAB
FLUAV RNA SPEC QL NAA+PROBE: NEGATIVE
FLUBV RNA SPEC QL NAA+PROBE: NEGATIVE
RSV RNA SPEC QL NAA+PROBE: NEGATIVE
S PYO DNA SPEC NAA+PROBE: NOT DETECTED
SARS-COV-2 RNA RESP QL NAA+PROBE: NEGATIVE

## 2025-07-06 PROCEDURE — 87651 STREP A DNA AMP PROBE: CPT | Performed by: STUDENT IN AN ORGANIZED HEALTH CARE EDUCATION/TRAINING PROGRAM

## 2025-07-06 PROCEDURE — 3074F SYST BP LT 130 MM HG: CPT | Performed by: STUDENT IN AN ORGANIZED HEALTH CARE EDUCATION/TRAINING PROGRAM

## 2025-07-06 PROCEDURE — 99213 OFFICE O/P EST LOW 20 MIN: CPT | Performed by: STUDENT IN AN ORGANIZED HEALTH CARE EDUCATION/TRAINING PROGRAM

## 2025-07-06 PROCEDURE — 3078F DIAST BP <80 MM HG: CPT | Performed by: STUDENT IN AN ORGANIZED HEALTH CARE EDUCATION/TRAINING PROGRAM

## 2025-07-06 PROCEDURE — 87637 SARSCOV2&INF A&B&RSV AMP PRB: CPT | Performed by: STUDENT IN AN ORGANIZED HEALTH CARE EDUCATION/TRAINING PROGRAM

## 2025-07-06 RX ORDER — ALBUTEROL SULFATE 90 UG/1
2 INHALANT RESPIRATORY (INHALATION) EVERY 6 HOURS PRN
Qty: 8.5 G | Refills: 0 | Status: SHIPPED | OUTPATIENT
Start: 2025-07-06

## 2025-07-06 RX ORDER — METHYLPREDNISOLONE 4 MG/1
TABLET ORAL
Qty: 21 TABLET | Refills: 0 | Status: SHIPPED | OUTPATIENT
Start: 2025-07-06

## 2025-07-06 ASSESSMENT — ENCOUNTER SYMPTOMS: COUGH: 1

## 2025-07-06 NOTE — PROGRESS NOTES
"Hank Barragan is a 39 y.o. female who presents with Cough (X 3 days cough, sore throat, dark green mucus, chest discomfort)            Cough  This is a new problem. Episode onset: 3 days. The cough is Productive of sputum. Associated symptoms include a sore throat and wheezing. Pertinent negatives include no chest pain, chills, ear pain, fever, hemoptysis or shortness of breath.       Review of Systems   Constitutional:  Positive for malaise/fatigue. Negative for chills and fever.   HENT:  Positive for congestion and sore throat. Negative for ear pain.    Respiratory:  Positive for cough, sputum production and wheezing. Negative for hemoptysis and shortness of breath.    Cardiovascular:  Positive for palpitations. Negative for chest pain.   All other systems reviewed and are negative.             Objective     /58   Pulse (!) 109   Temp 36.7 °C (98.1 °F)   Resp 16   Ht 1.575 m (5' 2\")   Wt 76.7 kg (169 lb)   LMP 07/20/2020 (Approximate)   SpO2 99%   BMI 30.91 kg/m²      Physical Exam  Vitals reviewed.   Constitutional:       General: She is not in acute distress.     Appearance: Normal appearance. She is not toxic-appearing.   HENT:      Head: Normocephalic and atraumatic.      Nose: Nose normal.      Mouth/Throat:      Lips: Pink.      Mouth: Mucous membranes are moist.      Pharynx: Oropharynx is clear. Uvula midline. Posterior oropharyngeal erythema and postnasal drip present.   Eyes:      Extraocular Movements: Extraocular movements intact.      Conjunctiva/sclera: Conjunctivae normal.      Pupils: Pupils are equal, round, and reactive to light.   Cardiovascular:      Rate and Rhythm: Normal rate.   Pulmonary:      Effort: Pulmonary effort is normal. No respiratory distress.      Breath sounds: Normal breath sounds. No stridor. No wheezing, rhonchi or rales.   Skin:     General: Skin is warm and dry.   Neurological:      General: No focal deficit present.      Mental Status: She " is alert and oriented to person, place, and time.                                Assessment & Plan  Viral URI with cough    Orders:    POCT CoV-2, Flu A/B, RSV by PCR    POCT CEPHEID GROUP A STREP - PCR    methylPREDNISolone (MEDROL DOSEPAK) 4 MG Tablet Therapy Pack; Follow schedule on package instructions.    albuterol 108 (90 Base) MCG/ACT Aero Soln inhalation aerosol; Inhale 2 Puffs every 6 hours as needed (shortness of breath, wheezing and/or cough).           Differential diagnoses, supportive care measures and indications for immediate follow-up discussed with patient. Pathogenesis of diagnosis discussed including typical length and natural progression.      Instructed to return to urgent care or nearest emergency department if symptoms fail to improve, for any change in condition, further concerns, or new concerning symptoms.    Patient states understanding and agrees with the plan of care and discharge instructions.

## 2025-07-09 ASSESSMENT — ENCOUNTER SYMPTOMS
SHORTNESS OF BREATH: 0
FEVER: 0
HEMOPTYSIS: 0
SPUTUM PRODUCTION: 1
PALPITATIONS: 1
CHILLS: 0
SORE THROAT: 1
WHEEZING: 1

## 2025-07-10 ENCOUNTER — OFFICE VISIT (OUTPATIENT)
Dept: URGENT CARE | Facility: PHYSICIAN GROUP | Age: 40
End: 2025-07-10
Payer: COMMERCIAL

## 2025-07-10 ENCOUNTER — HOSPITAL ENCOUNTER (OUTPATIENT)
Dept: RADIOLOGY | Facility: MEDICAL CENTER | Age: 40
End: 2025-07-10
Attending: PHYSICIAN ASSISTANT
Payer: COMMERCIAL

## 2025-07-10 VITALS
SYSTOLIC BLOOD PRESSURE: 110 MMHG | RESPIRATION RATE: 20 BRPM | HEART RATE: 109 BPM | HEIGHT: 63 IN | BODY MASS INDEX: 29.84 KG/M2 | DIASTOLIC BLOOD PRESSURE: 74 MMHG | TEMPERATURE: 97.9 F | WEIGHT: 168.4 LBS | OXYGEN SATURATION: 98 %

## 2025-07-10 DIAGNOSIS — R05.1 ACUTE COUGH: ICD-10-CM

## 2025-07-10 DIAGNOSIS — J22 LRTI (LOWER RESPIRATORY TRACT INFECTION): Primary | ICD-10-CM

## 2025-07-10 PROCEDURE — 99214 OFFICE O/P EST MOD 30 MIN: CPT | Performed by: PHYSICIAN ASSISTANT

## 2025-07-10 PROCEDURE — 3074F SYST BP LT 130 MM HG: CPT | Performed by: PHYSICIAN ASSISTANT

## 2025-07-10 PROCEDURE — 3078F DIAST BP <80 MM HG: CPT | Performed by: PHYSICIAN ASSISTANT

## 2025-07-10 PROCEDURE — 71046 X-RAY EXAM CHEST 2 VIEWS: CPT

## 2025-07-10 RX ORDER — BENZONATATE 100 MG/1
100 CAPSULE ORAL 3 TIMES DAILY PRN
Qty: 30 CAPSULE | Refills: 0 | Status: SHIPPED | OUTPATIENT
Start: 2025-07-10

## 2025-07-10 RX ORDER — DEXTROMETHORPHAN HYDROBROMIDE AND PROMETHAZINE HYDROCHLORIDE 15; 6.25 MG/5ML; MG/5ML
5 SYRUP ORAL EVERY 6 HOURS PRN
Qty: 100 ML | Refills: 0 | Status: SHIPPED | OUTPATIENT
Start: 2025-07-10 | End: 2025-07-15

## 2025-07-10 ASSESSMENT — ENCOUNTER SYMPTOMS
MYALGIAS: 1
SPUTUM PRODUCTION: 1
SHORTNESS OF BREATH: 1
HEMOPTYSIS: 0
FEVER: 1
COUGH: 1
PALPITATIONS: 0
HEADACHES: 1
SORE THROAT: 1
WHEEZING: 0

## 2025-07-10 NOTE — PROGRESS NOTES
"  Subjective:   Moon Barragan is a 39 y.o. female who presents today with   Chief Complaint   Patient presents with    URI     Fever, coughing non stop, sob, bilateral ear pain, headache, sore throat, X 1 week.     Cough  This is a new problem. The current episode started in the past 7 days. The problem has been gradually worsening. The problem occurs every few minutes. The cough is Productive of sputum. Associated symptoms include a fever, headaches, myalgias, a sore throat and shortness of breath. Pertinent negatives include no chest pain, hemoptysis or wheezing. Treatments tried: steroid, inhaler. The treatment provided no relief.     Patient was seen 4 days ago in urgent care and had albuterol, Medrol Dosepak prescribed at that time.  Viral and strep testing was negative.    PMH:  has a past medical history of Anesthesia, Bowel habit changes, Delayed emergence from general anesthesia (07/2020), Dental disorder, Heart burn, and PONV (postoperative nausea and vomiting).  MEDS: Current Medications[1]  ALLERGIES: Allergies[2]  SURGHX: Past Surgical History[3]  SOCHX:  reports that she has never smoked. She has never been exposed to tobacco smoke. She has never used smokeless tobacco. She reports that she does not currently use alcohol. She reports that she does not use drugs.  FH: Reviewed with patient, not pertinent to this visit.     Review of Systems   Constitutional:  Positive for fever.   HENT:  Positive for sore throat.    Respiratory:  Positive for cough, sputum production and shortness of breath. Negative for hemoptysis and wheezing.    Cardiovascular:  Negative for chest pain and palpitations.   Musculoskeletal:  Positive for myalgias.   Neurological:  Positive for headaches.      Objective:   /74 (BP Location: Right arm, Patient Position: Sitting, BP Cuff Size: Adult)   Pulse (!) 109   Temp 36.6 °C (97.9 °F)   Resp 20   Ht 1.594 m (5' 2.75\")   Wt 76.4 kg (168 lb 6.4 oz)   LMP 07/20/2020 " (Approximate)   SpO2 98%   BMI 30.07 kg/m²   Physical Exam  Vitals and nursing note reviewed.   Constitutional:       General: She is not in acute distress.     Appearance: Normal appearance. She is well-developed. She is not ill-appearing or toxic-appearing.   HENT:      Head: Normocephalic and atraumatic.      Right Ear: Hearing normal.      Left Ear: Hearing normal.      Nose: Congestion present.      Mouth/Throat:      Mouth: Mucous membranes are moist.      Pharynx: No oropharyngeal exudate or posterior oropharyngeal erythema.   Cardiovascular:      Rate and Rhythm: Normal rate.   Pulmonary:      Effort: Pulmonary effort is normal. No respiratory distress.      Breath sounds: Normal breath sounds. No stridor. No wheezing, rhonchi or rales.   Musculoskeletal:      Comments: Normal movement in all 4 extremities   Skin:     General: Skin is warm and dry.   Neurological:      Mental Status: She is alert.      Coordination: Coordination normal.   Psychiatric:         Mood and Affect: Mood normal.       DX CHEST  FINDINGS:  Cardiomediastinal contour is within normal limits.  Lungs show hypoinflation  No focal pulmonary consolidation.  No pleural fluid collection or pneumothorax.  No major bony abnormality is seen.     IMPRESSION:     Hypoinflation without other evidence for acute cardiopulmonary disease.    Assessment/Plan:   Assessment    1. Acute cough  - DX-CHEST-2 VIEWS; Future  - promethazine-dextromethorphan (PROMETHAZINE-DM) 6.25-15 MG/5ML syrup; Take 5 mL by mouth every 6 hours as needed for Cough for up to 5 days.  Dispense: 100 mL; Refill: 0  - benzonatate (TESSALON) 100 MG Cap; Take 1 Capsule by mouth 3 times a day as needed for Cough.  Dispense: 30 Capsule; Refill: 0    2. LRTI (lower respiratory tract infection)  - amoxicillin-clavulanate (AUGMENTIN) 875-125 MG Tab; Take 1 Tablet by mouth 2 times a day for 7 days.  Dispense: 14 Tablet; Refill: 0    Symptoms and presentation appear consistent with  respiratory tract infection given duration of the symptoms recommend treating with antibiotics to cover for bacterial etiology.  Suggest patient continue with previously prescribed medications as well  Suggest ruling out pneumonia with x-ray.  Discussed with patient possible side effects including drowsiness caused by cough syrup and patient understands to only use sparingly as prescribed mostly at night and not before driving or working.    Differential diagnosis, natural history, supportive care, and indications for immediate follow-up discussed.   Patient given instructions and understanding of medications and treatment.    If not improving in 3-5 days, F/U with PCP or return to  if symptoms worsen.    Patient agreeable to plan.      Please note that this dictation was created using voice recognition software. I have made every reasonable attempt to correct obvious errors, but I expect that there are errors of grammar and possibly content that I did not discover before finalizing the note.    Daryl Dooley PA-C         [1]   Current Outpatient Medications:     promethazine-dextromethorphan (PROMETHAZINE-DM) 6.25-15 MG/5ML syrup, Take 5 mL by mouth every 6 hours as needed for Cough for up to 5 days., Disp: 100 mL, Rfl: 0    benzonatate (TESSALON) 100 MG Cap, Take 1 Capsule by mouth 3 times a day as needed for Cough., Disp: 30 Capsule, Rfl: 0    amoxicillin-clavulanate (AUGMENTIN) 875-125 MG Tab, Take 1 Tablet by mouth 2 times a day for 7 days., Disp: 14 Tablet, Rfl: 0    methylPREDNISolone (MEDROL DOSEPAK) 4 MG Tablet Therapy Pack, Follow schedule on package instructions., Disp: 21 Tablet, Rfl: 0    albuterol 108 (90 Base) MCG/ACT Aero Soln inhalation aerosol, Inhale 2 Puffs every 6 hours as needed (shortness of breath, wheezing and/or cough)., Disp: 8.5 g, Rfl: 0  [2] No Known Allergies  [3]   Past Surgical History:  Procedure Laterality Date    IN COMBINED ANT/POST COLPORRHAPHY N/A 8/17/2021    Procedure:  COLPORRHAPHY, COMBINED ANTEROPOSTERIOR - WITH PERINEOPLASTY;  Surgeon: Santos Martinez M.D.;  Location: SURGERY SAME DAY [facility];  Service: Gynecology    AR CYSTOURETHROSCOPY N/A 8/17/2021    Procedure: CYSTOSCOPY.;  Surgeon: Santos Martinez M.D.;  Location: SURGERY SAME DAY [facility];  Service: Gynecology    ENTEROCELE REPAIR N/A 8/17/2021    Procedure: REPAIR, ENTEROCELE;  Surgeon: Santos Martinez M.D.;  Location: SURGERY SAME DAY [facility];  Service: Gynecology    BLADDER SLING FEMALE N/A 8/17/2021    Procedure: BLADDER SLING, FEMALE - MIDURETHRAL SLING;  Surgeon: Santos Martinez M.D.;  Location: SURGERY SAME DAY [facility];  Service: Gynecology    VAGINAL SUSPENSION N/A 8/17/2021    Procedure: COLPOPEXY - SACROSPINOUS VAULT SUSPENSION;  Surgeon: Santos Martinez M.D.;  Location: SURGERY SAME DAY [facility];  Service: Gynecology    AR PELVIC EXAMINATION W ANESTH N/A 7/24/2020    Procedure: EXAM UNDER ANESTHESIA, PELVIS;  Surgeon: Heidi Norris M.D.;  Location: [facility];  Service: Gynecology    AR LAP,DIAGNOSTIC ABDOMEN N/A 7/24/2020    Procedure: PELVISCOPY;  Surgeon: Heidi Norris M.D.;  Location: [facility];  Service: Gynecology    CYSTOSCOPY N/A 7/24/2020    Procedure: CYSTOSCOPY- AND OTHER MEDICALLY NECESSARY PROCEDURES;  Surgeon: Heidi Norris M.D.;  Location: SURGERY [facility];  Service: Gynecology    INCISION AND DRAINAGE N/A 7/24/2020    Procedure: INCISION AND DRAINAGE - NEXPLANON REMOVAL;  Surgeon: Heidi Norris M.D.;  Location: SURGERY [facility];  Service: Gynecology    VAGINAL HYSTERECTOMY SCOPE TOTAL N/A 7/24/2020    Procedure: HYSTERECTOMY, TOTAL, VAGINAL, LAPAROSCOPY-ASSISTED;  Surgeon: Heidi Norris M.D.;  Location: [facility];  Service: Gynecology    SALPINGECTOMY Bilateral 7/24/2020    Procedure: SALPINGECTOMY;  Surgeon: Heidi Norris M.D.;  Location: SURGERY [facility];  Service:  Gynecology    OTHER  02/2018    cervical fusion [id number]    OTHER ORTHOPEDIC SURGERY Right 02/2017    shoulder- rotator cuff

## (undated) DEVICE — DRAPE VAGINAL BIB W/ POUCH (10EA/CA)

## (undated) DEVICE — HEAD HOLDER JUNIOR/ADULT

## (undated) DEVICE — SPONGE GAUZESTER 4 X 4 4PLY - (128PK/CA)

## (undated) DEVICE — SUTURE 2-0 VICRYL PLUS CT-1 36 (36PK/BX)"

## (undated) DEVICE — KIT  I.V. START (100EA/CA)

## (undated) DEVICE — PACK LAPAROSCOPY - (1/CA)

## (undated) DEVICE — WATER IRRIGATION STERILE 1000ML (12EA/CA)

## (undated) DEVICE — PAD SANITARY 11IN MAXI IND WRAPPED  (12EA/PK 24PK/CA)

## (undated) DEVICE — ELECTRODE 850 FOAM ADHESIVE - HYDROGEL RADIOTRNSPRNT (50/PK)

## (undated) DEVICE — LACTATED RINGERS INJ 1000 ML - (14EA/CA 60CA/PF)

## (undated) DEVICE — TRAY FOLEY CATHETER STATLOCK 16FR SURESTEP  (10EA/CA)

## (undated) DEVICE — CANNULA W/SEAL 5X100 ADVANC - FIXATION (12/BX)

## (undated) DEVICE — DRAPE LAPAROTOMY T SHEET - (12EA/CA)

## (undated) DEVICE — SET LEADWIRE 5 LEAD BEDSIDE DISPOSABLE ECG (1SET OF 5/EA)

## (undated) DEVICE — MASK ANESTHESIA ADULT  - (100/CA)

## (undated) DEVICE — UTERINE MANIP V-CARE LARGE - (DAVINCI)  8/CA

## (undated) DEVICE — KIT ANESTHESIA W/CIRCUIT & 3/LT BAG W/FILTER (20EA/CA)

## (undated) DEVICE — WATER IRRIG. STER 3000 ML - (4/CA)

## (undated) DEVICE — SET EXTENSION WITH 2 PORTS (48EA/CA) ***PART #2C8610 IS A SUBSTITUTE*****

## (undated) DEVICE — SET IRRIGATION CYSTOSCOPY TUBE L80 IN (20EA/CA)

## (undated) DEVICE — DEVICE SUTURE CAPTURING

## (undated) DEVICE — BLADE SURGICAL CLIPPER - (50EA/CA)

## (undated) DEVICE — STERI STRIP COMPOUND BENZOIN - TINCTURE 0.6ML WITH APPLICATOR (40EA/BX)

## (undated) DEVICE — SODIUM CHL IRRIGATION 0.9% 1000ML (12EA/CA)

## (undated) DEVICE — TOWEL STOP TIMEOUT SAFETY FLAG (40EA/CA)

## (undated) DEVICE — NEPTUNE 4 PORT MANIFOLD - (20/PK)

## (undated) DEVICE — SUTURE CAPIO (12EA/BX)

## (undated) DEVICE — TRAY SRGPRP PVP IOD WT PRP - (20/CA)

## (undated) DEVICE — SPONGE GAUZESTER. 2X2 4-PL - (2/PK 50PK/BX 30BX/CS)

## (undated) DEVICE — TUBE CONNECTING SUCTION - CLEAR PLASTIC STERILE 72 IN (50EA/CA)

## (undated) DEVICE — Device

## (undated) DEVICE — STAPLER SKIN DISP - (6/BX 10BX/CA) VISISTAT

## (undated) DEVICE — TUBE CONNECT SUCTION CLEAR 120 X 1/4" (50EA/CA)"

## (undated) DEVICE — CHLORAPREP 26 ML APPLICATOR - ORANGE TINT(25/CA)

## (undated) DEVICE — CANISTER SUCTION 3000ML MECHANICAL FILTER AUTO SHUTOFF MEDI-VAC NONSTERILE LF DISP  (40EA/CA)

## (undated) DEVICE — TROCAR LAPSCP 100MM 12MM NTHRD - (6/BX)

## (undated) DEVICE — TUBING CLEARLINK DUO-VENT - C-FLO (48EA/CA)

## (undated) DEVICE — DERMABOND ADVANCED - (12EA/BX)

## (undated) DEVICE — GOWN WARMING STANDARD FLEX - (30/CA)

## (undated) DEVICE — TOWELS CLOTH SURGICAL - (4/PK 20PK/CA)

## (undated) DEVICE — DETERGENT RENUZYME PLUS 10 OZ PACKET (50/BX)

## (undated) DEVICE — JELLY SURGILUBE STERILE TUBE 4.25 OZ (1/EA)

## (undated) DEVICE — PAD OR TABLE DA VINCI 2IN X 20IN X 72IN - (12EA/CA)

## (undated) DEVICE — SENSOR SPO2 NEO LNCS ADHESIVE (20/BX) SEE USER NOTES

## (undated) DEVICE — SYRINGE 10 ML CONTROL LL (25EA/BX 4BX/CA)

## (undated) DEVICE — SUTURE 0 VICRYL PLUS UR-6 - 27 INCH (36/BX)

## (undated) DEVICE — GLOVE BIOGEL SZ 8 SURGICAL PF LTX - (50PR/BX 4BX/CA)

## (undated) DEVICE — SET SUCTION/IRRIGATION WITH DISPOSABLE TIP (6/CA )PART #0250-070-520 IS A SUB

## (undated) DEVICE — PROTECTOR ULNA NERVE - (36PR/CA)

## (undated) DEVICE — SUTURE GENERAL

## (undated) DEVICE — SLEEVE, VASO, THIGH, MED

## (undated) DEVICE — DRAPESURG STERI-DRAPE LONG - (10/BX 4BX/CA)

## (undated) DEVICE — GLOVE BIOGEL SZ 7 SURGICAL PF LTX - (50PR/BX 4BX/CA)

## (undated) DEVICE — UTERINE MANIP RUMI 6.7X6 - (5/BX)

## (undated) DEVICE — BLADE SURGICAL #15 - (50/BX 3BX/CA)

## (undated) DEVICE — LIGASURE LAPAROSCOPIC 5MM - (6EA/CA)

## (undated) DEVICE — SLEEVE VASO CALF MED - (10PR/CA)

## (undated) DEVICE — UTERINE MANIP RUMI 6.7X10 - (5/BX)

## (undated) DEVICE — ENDO PEANUT 173019

## (undated) DEVICE — GLOVE BIOGEL INDICATOR SZ 7SURGICAL PF LTX - (50/BX 4BX/CA)

## (undated) DEVICE — CLOSURE SKIN STRIP 1/2 X 4 IN - (STERI STRIP) (50/BX 4BX/CA)

## (undated) DEVICE — SUTURE 0 VICRYL PLUS CT-2 - 27 INCH (36/BX)

## (undated) DEVICE — SUTURE 0 VICRYL PLUS CT-1 - 8 X 18 INCH (12/BX)

## (undated) DEVICE — SUTURE 0 VICRYL PLUS CT-2 - 8 X 18 INCH (12/BX)

## (undated) DEVICE — SUTURE 0 VICRYL PLUS CT-1 - 36 INCH (36/BX)

## (undated) DEVICE — CATHETER IV 20 GA X 1-1/4 ---SURG.& SDS ONLY--- (50EA/BX)

## (undated) DEVICE — PACK MINOR BASIN - (2EA/CA)

## (undated) DEVICE — HEMOSTAT ARISTA PWD 5 GRAM - (5/BX)

## (undated) DEVICE — ELECTRODE DUAL RETURN W/ CORD - (50/PK)

## (undated) DEVICE — TRAY CATHETER FOLEY URINE METER W/STATLOCK 350ML (10EA/CA)

## (undated) DEVICE — GLOVE BIOGEL SZ 7.5 SURGICAL PF LTX - (50PR/BX 4BX/CA)

## (undated) DEVICE — GLOVE BIOGEL SZ 6.5 SURGICAL PF LTX (50PR/BX 4BX/CA)

## (undated) DEVICE — CANISTER SUCTION RIGID RED 1500CC (40EA/CA)

## (undated) DEVICE — ELECTROSURGICAL KOH EFFICIENT 2.5CM

## (undated) DEVICE — SUCTION INSTRUMENT YANKAUER BULBOUS TIP W/O VENT (50EA/CA)

## (undated) DEVICE — TROCAR 5X100 SEPARTATOR ADV - FIXATION (6/BX)

## (undated) DEVICE — UTERINE MANIP RUMI 6.7X8 - (5/BX)